# Patient Record
Sex: FEMALE | Race: WHITE | NOT HISPANIC OR LATINO | Employment: FULL TIME | URBAN - NONMETROPOLITAN AREA
[De-identification: names, ages, dates, MRNs, and addresses within clinical notes are randomized per-mention and may not be internally consistent; named-entity substitution may affect disease eponyms.]

---

## 2017-01-27 RX ORDER — CIPROFLOXACIN AND DEXAMETHASONE 3; 1 MG/ML; MG/ML
4 SUSPENSION/ DROPS AURICULAR (OTIC) 2 TIMES DAILY
Qty: 7.5 ML | Refills: 0 | Status: SHIPPED | OUTPATIENT
Start: 2017-01-27 | End: 2017-09-20

## 2017-07-11 RX ORDER — ONDANSETRON 4 MG/1
4 TABLET, FILM COATED ORAL EVERY 6 HOURS PRN
Qty: 30 TABLET | Refills: 1 | Status: SHIPPED | OUTPATIENT
Start: 2017-07-11 | End: 2019-02-19 | Stop reason: SDUPTHER

## 2017-09-14 DIAGNOSIS — Z00.00 WELLNESS EXAMINATION: Primary | ICD-10-CM

## 2017-09-19 ENCOUNTER — LAB (OUTPATIENT)
Dept: LAB | Facility: OTHER | Age: 37
End: 2017-09-19

## 2017-09-19 DIAGNOSIS — Z00.00 WELLNESS EXAMINATION: ICD-10-CM

## 2017-09-19 LAB
25(OH)D3 SERPL-MCNC: 63.7 NG/ML (ref 30–100)
ALBUMIN SERPL-MCNC: 4 G/DL (ref 3.2–5.5)
ALBUMIN/GLOB SERPL: 1 G/DL (ref 1–3)
ALP SERPL-CCNC: 56 U/L (ref 15–121)
ALT SERPL W P-5'-P-CCNC: 21 U/L (ref 10–60)
ANION GAP SERPL CALCULATED.3IONS-SCNC: 11 MMOL/L (ref 5–15)
AST SERPL-CCNC: 24 U/L (ref 10–60)
BASOPHILS # BLD AUTO: 0.06 10*3/MM3 (ref 0–0.2)
BASOPHILS NFR BLD AUTO: 0.9 % (ref 0–2)
BILIRUB SERPL-MCNC: 0.3 MG/DL (ref 0.2–1)
BUN BLD-MCNC: 12 MG/DL (ref 8–25)
BUN/CREAT SERPL: 15 (ref 7–25)
CALCIUM SPEC-SCNC: 9.7 MG/DL (ref 8.4–10.8)
CHLORIDE SERPL-SCNC: 103 MMOL/L (ref 100–112)
CHOLEST SERPL-MCNC: 214 MG/DL (ref 150–200)
CO2 SERPL-SCNC: 26 MMOL/L (ref 20–32)
CREAT BLD-MCNC: 0.8 MG/DL (ref 0.4–1.3)
DEPRECATED RDW RBC AUTO: 41 FL (ref 36.4–46.3)
EOSINOPHIL # BLD AUTO: 0.83 10*3/MM3 (ref 0–0.7)
EOSINOPHIL NFR BLD AUTO: 12.4 % (ref 0–7)
ERYTHROCYTE [DISTWIDTH] IN BLOOD BY AUTOMATED COUNT: 13.6 % (ref 11.5–14.5)
GFR SERPL CREATININE-BSD FRML MDRD: 80 ML/MIN/1.73 (ref 64–149)
GLOBULIN UR ELPH-MCNC: 4.1 GM/DL (ref 2.5–4.6)
GLUCOSE BLD-MCNC: 103 MG/DL (ref 70–100)
HCT VFR BLD AUTO: 43.3 % (ref 35–45)
HDLC SERPL-MCNC: 62 MG/DL (ref 35–100)
HGB BLD-MCNC: 14.2 G/DL (ref 12–15.5)
LDLC SERPL CALC-MCNC: 136 MG/DL
LDLC/HDLC SERPL: 2.19 {RATIO}
LYMPHOCYTES # BLD AUTO: 2.04 10*3/MM3 (ref 0.6–4.2)
LYMPHOCYTES NFR BLD AUTO: 30.4 % (ref 10–50)
MCH RBC QN AUTO: 27.5 PG (ref 26.5–34)
MCHC RBC AUTO-ENTMCNC: 32.8 G/DL (ref 31.4–36)
MCV RBC AUTO: 83.8 FL (ref 80–98)
MONOCYTES # BLD AUTO: 0.61 10*3/MM3 (ref 0–0.9)
MONOCYTES NFR BLD AUTO: 9.1 % (ref 0–12)
NEUTROPHILS # BLD AUTO: 3.17 10*3/MM3 (ref 2–8.6)
NEUTROPHILS NFR BLD AUTO: 47.2 % (ref 37–80)
PLATELET # BLD AUTO: 316 10*3/MM3 (ref 150–450)
PMV BLD AUTO: 9.1 FL (ref 8–12)
POTASSIUM BLD-SCNC: 4.9 MMOL/L (ref 3.4–5.4)
PROT SERPL-MCNC: 8.1 G/DL (ref 6.7–8.2)
RBC # BLD AUTO: 5.17 10*6/MM3 (ref 3.77–5.16)
SODIUM BLD-SCNC: 140 MMOL/L (ref 134–146)
T4 FREE SERPL-MCNC: 1.24 NG/DL (ref 0.78–2.19)
TRIGL SERPL-MCNC: 81 MG/DL (ref 35–160)
TSH SERPL DL<=0.05 MIU/L-ACNC: 1.87 MIU/ML (ref 0.46–4.68)
VIT B12 BLD-MCNC: 533 PG/ML (ref 239–931)
VLDLC SERPL-MCNC: 16.2 MG/DL
WBC NRBC COR # BLD: 6.71 10*3/MM3 (ref 3.2–9.8)

## 2017-09-19 PROCEDURE — 82306 VITAMIN D 25 HYDROXY: CPT | Performed by: FAMILY MEDICINE

## 2017-09-19 PROCEDURE — 80061 LIPID PANEL: CPT | Performed by: FAMILY MEDICINE

## 2017-09-19 PROCEDURE — 84439 ASSAY OF FREE THYROXINE: CPT | Performed by: FAMILY MEDICINE

## 2017-09-19 PROCEDURE — 36415 COLL VENOUS BLD VENIPUNCTURE: CPT | Performed by: FAMILY MEDICINE

## 2017-09-19 PROCEDURE — 82607 VITAMIN B-12: CPT | Performed by: FAMILY MEDICINE

## 2017-09-19 PROCEDURE — 85025 COMPLETE CBC W/AUTO DIFF WBC: CPT | Performed by: FAMILY MEDICINE

## 2017-09-19 PROCEDURE — 84443 ASSAY THYROID STIM HORMONE: CPT | Performed by: FAMILY MEDICINE

## 2017-09-19 PROCEDURE — 80053 COMPREHEN METABOLIC PANEL: CPT | Performed by: FAMILY MEDICINE

## 2017-09-20 ENCOUNTER — OFFICE VISIT (OUTPATIENT)
Dept: FAMILY MEDICINE CLINIC | Facility: CLINIC | Age: 37
End: 2017-09-20

## 2017-09-20 VITALS
DIASTOLIC BLOOD PRESSURE: 68 MMHG | HEIGHT: 64 IN | SYSTOLIC BLOOD PRESSURE: 116 MMHG | WEIGHT: 155 LBS | TEMPERATURE: 97 F | HEART RATE: 72 BPM | BODY MASS INDEX: 26.46 KG/M2

## 2017-09-20 DIAGNOSIS — D48.5 NEOPLASM OF UNCERTAIN BEHAVIOR OF SKIN: ICD-10-CM

## 2017-09-20 DIAGNOSIS — E66.9 OBESITY, UNSPECIFIED OBESITY SEVERITY, UNSPECIFIED OBESITY TYPE: Primary | ICD-10-CM

## 2017-09-20 PROCEDURE — 99395 PREV VISIT EST AGE 18-39: CPT | Performed by: FAMILY MEDICINE

## 2017-09-20 NOTE — PROGRESS NOTES
Subjective   Michelle Powell is a 37 y.o. female who presents to the office for follow-up and review of labs.  She is concerned about several skin lesions and has not had a good skin survey done.  She is still struggling with weight issues and has gained a bit.  She is interested in treatment with something.  She stopped the phentermine as it did not seem to be helping anymore.    History of Present Illness         Review of Systems   Constitutional: Negative.    HENT: Negative.    Respiratory: Negative.  Negative for shortness of breath.    Cardiovascular: Negative.  Negative for chest pain.   Gastrointestinal: Negative.    Musculoskeletal: Negative.  Negative for myalgias.   Skin: Negative.    Allergic/Immunologic: Negative for immunocompromised state.   Neurological: Negative for dizziness, tremors, seizures, syncope, weakness and numbness.   Hematological: Negative.    Psychiatric/Behavioral: Negative for agitation, confusion, dysphoric mood and sleep disturbance. The patient is not nervous/anxious.    All other systems reviewed and are negative.      Objective   Physical Exam   Constitutional: She is oriented to person, place, and time. She appears well-developed and well-nourished.   HENT:   Head: Normocephalic and atraumatic.   Nose: Nose normal.   Mouth/Throat: Oropharynx is clear and moist.   Eyes: Conjunctivae and EOM are normal. Pupils are equal, round, and reactive to light.   Neck: Normal range of motion. Neck supple. No JVD present. No tracheal deviation present. No thyromegaly present.   Cardiovascular: Normal rate, regular rhythm, normal heart sounds and intact distal pulses.    No murmur heard.  Pulmonary/Chest: Effort normal and breath sounds normal. She has no wheezes.   Abdominal: Soft. Bowel sounds are normal. She exhibits no distension. There is no tenderness.   Musculoskeletal: Normal range of motion. She exhibits no edema.   Lymphadenopathy:     She has no cervical adenopathy.    Neurological: She is alert and oriented to person, place, and time. Coordination normal.   Skin: Skin is warm and dry. No rash noted.   Several pigmented moles and seborrheic areas noted over the skin   Psychiatric: She has a normal mood and affect.   Nursing note and vitals reviewed.      Assessment/Plan   Charlie was seen today for wellness.    Diagnoses and all orders for this visit:    Obesity, unspecified obesity severity, unspecified obesity type    Neoplasm of uncertain behavior of skin  -     Ambulatory Referral to Dermatology    Other orders  -     Liraglutide -Weight Management (SAXENDA) 18 MG/3ML solution pen-injector; Inject 3 mg under the skin Daily for 180 days. Start with 0.6mg daily for 1 week, then 1.2mg daily, then 1.8mg daily    Will refer to dermatology for skin survey    We'll try Saxenda for weight loss, along with diet and exercise.       Labs are reviewed with patient.      Lab on 09/19/2017   Component Date Value Ref Range Status   • Glucose 09/19/2017 103* 70 - 100 mg/dL Final   • BUN 09/19/2017 12  8 - 25 mg/dL Final   • Creatinine 09/19/2017 0.80  0.40 - 1.30 mg/dL Final   • Sodium 09/19/2017 140  134 - 146 mmol/L Final   • Potassium 09/19/2017 4.9  3.4 - 5.4 mmol/L Final   • Chloride 09/19/2017 103  100 - 112 mmol/L Final   • CO2 09/19/2017 26.0  20.0 - 32.0 mmol/L Final   • Calcium 09/19/2017 9.7  8.4 - 10.8 mg/dL Final   • Total Protein 09/19/2017 8.1  6.7 - 8.2 g/dL Final   • Albumin 09/19/2017 4.00  3.20 - 5.50 g/dL Final   • ALT (SGPT) 09/19/2017 21  10 - 60 U/L Final   • AST (SGOT) 09/19/2017 24  10 - 60 U/L Final   • Alkaline Phosphatase 09/19/2017 56  15 - 121 U/L Final   • Total Bilirubin 09/19/2017 0.3  0.2 - 1.0 mg/dL Final   • eGFR Non African Amer 09/19/2017 80  64 - 149 mL/min/1.73 Final   • Globulin 09/19/2017 4.1  2.5 - 4.6 gm/dL Final   • A/G Ratio 09/19/2017 1.0  1.0 - 3.0 g/dL Final   • BUN/Creatinine Ratio 09/19/2017 15.0  7.0 - 25.0 Final   • Anion Gap 09/19/2017  11.0  5.0 - 15.0 mmol/L Final   • Total Cholesterol 09/19/2017 214* 150 - 200 mg/dL Final   • Triglycerides 09/19/2017 81  35 - 160 mg/dL Final   • HDL Cholesterol 09/19/2017 62  35 - 100 mg/dL Final   • LDL Cholesterol  09/19/2017 136  mg/dL Final   • VLDL Cholesterol 09/19/2017 16.2  mg/dL Final   • LDL/HDL Ratio 09/19/2017 2.19   Final   • Free T4 09/19/2017 1.24  0.78 - 2.19 ng/dL Final   • TSH 09/19/2017 1.870  0.460 - 4.680 mIU/mL Final   • Vitamin B-12 09/19/2017 533  239 - 931 pg/mL Final   • 25 Hydroxy, Vitamin D 09/19/2017 63.7  30.0 - 100.0 ng/ml Final   • WBC 09/19/2017 6.71  3.20 - 9.80 10*3/mm3 Final   • RBC 09/19/2017 5.17* 3.77 - 5.16 10*6/mm3 Final   • Hemoglobin 09/19/2017 14.2  12.0 - 15.5 g/dL Final   • Hematocrit 09/19/2017 43.3  35.0 - 45.0 % Final   • MCV 09/19/2017 83.8  80.0 - 98.0 fL Final   • MCH 09/19/2017 27.5  26.5 - 34.0 pg Final   • MCHC 09/19/2017 32.8  31.4 - 36.0 g/dL Final   • RDW 09/19/2017 13.6  11.5 - 14.5 % Final   • RDW-SD 09/19/2017 41.0  36.4 - 46.3 fl Final   • MPV 09/19/2017 9.1  8.0 - 12.0 fL Final   • Platelets 09/19/2017 316  150 - 450 10*3/mm3 Final   • Neutrophil % 09/19/2017 47.2  37.0 - 80.0 % Final   • Lymphocyte % 09/19/2017 30.4  10.0 - 50.0 % Final   • Monocyte % 09/19/2017 9.1  0.0 - 12.0 % Final   • Eosinophil % 09/19/2017 12.4* 0.0 - 7.0 % Final   • Basophil % 09/19/2017 0.9  0.0 - 2.0 % Final   • Neutrophils, Absolute 09/19/2017 3.17  2.00 - 8.60 10*3/mm3 Final   • Lymphocytes, Absolute 09/19/2017 2.04  0.60 - 4.20 10*3/mm3 Final   • Monocytes, Absolute 09/19/2017 0.61  0.00 - 0.90 10*3/mm3 Final   • Eosinophils, Absolute 09/19/2017 0.83* 0.00 - 0.70 10*3/mm3 Final   • Basophils, Absolute 09/19/2017 0.06  0.00 - 0.20 10*3/mm3 Final   ]

## 2017-09-22 ENCOUNTER — DOCUMENTATION (OUTPATIENT)
Dept: FAMILY MEDICINE CLINIC | Facility: CLINIC | Age: 37
End: 2017-09-22

## 2017-10-03 ENCOUNTER — OFFICE VISIT (OUTPATIENT)
Dept: FAMILY MEDICINE CLINIC | Facility: CLINIC | Age: 37
End: 2017-10-03

## 2017-10-03 VITALS
HEIGHT: 64 IN | WEIGHT: 155 LBS | DIASTOLIC BLOOD PRESSURE: 92 MMHG | SYSTOLIC BLOOD PRESSURE: 138 MMHG | BODY MASS INDEX: 26.46 KG/M2

## 2017-10-03 DIAGNOSIS — M25.561 RECURRENT PAIN OF RIGHT KNEE: Primary | ICD-10-CM

## 2017-10-03 PROCEDURE — 99214 OFFICE O/P EST MOD 30 MIN: CPT | Performed by: FAMILY MEDICINE

## 2017-10-03 NOTE — PROGRESS NOTES
Subjective   IsisTahir Powell is a 37 y.o. female who presents to the office for knee pain for over 2 years it is getting worse.  She does a lot of walking over the weekend and this seemed to exacerbate it.  Wearing a knee brace seems to help but she's having symptoms of instability along with the pain and grinding especially noticed when she goes up and down steps.  When she's walking down steps or downhill it feels very unstable.  .  Knee Pain    The incident occurred more than 1 week ago. There was no injury mechanism. The pain is present in the right knee. The quality of the pain is described as aching and stabbing. The pain is at a severity of 6/10. The pain is moderate. The pain has been intermittent since onset. Associated symptoms include a loss of motion. Pertinent negatives include no inability to bear weight, loss of sensation, muscle weakness, numbness or tingling. She reports no foreign bodies present. The symptoms are aggravated by movement and weight bearing. She has tried rest and immobilization for the symptoms. The treatment provided mild relief.        The following portions of the patient's history were reviewed and updated as appropriate: allergies, current medications, past family history, past medical history, past social history, past surgical history and problem list.    Review of Systems   Constitutional: Negative.    HENT: Negative.    Respiratory: Negative.  Negative for shortness of breath.    Cardiovascular: Negative.  Negative for chest pain.   Gastrointestinal: Negative.    Musculoskeletal: Negative.  Negative for myalgias.   Skin: Negative.    Allergic/Immunologic: Negative for immunocompromised state.   Neurological: Negative for dizziness, tingling, tremors, seizures, syncope, weakness and numbness.   Hematological: Negative.    Psychiatric/Behavioral: Negative for agitation, confusion, dysphoric mood and sleep disturbance. The patient is not nervous/anxious.    All other  systems reviewed and are negative.      Objective   Physical Exam   Constitutional: She is oriented to person, place, and time. She appears well-developed and well-nourished.   HENT:   Head: Normocephalic and atraumatic.   Nose: Nose normal.   Mouth/Throat: Oropharynx is clear and moist.   Eyes: Conjunctivae and EOM are normal. Pupils are equal, round, and reactive to light.   Neck: Normal range of motion. Neck supple. No JVD present. No tracheal deviation present. No thyromegaly present.   Cardiovascular: Normal rate, regular rhythm, normal heart sounds and intact distal pulses.    No murmur heard.  Pulmonary/Chest: Effort normal and breath sounds normal. She has no wheezes.   Abdominal: Soft. Bowel sounds are normal. She exhibits no distension. There is no tenderness.   Musculoskeletal: Normal range of motion. She exhibits no edema.   Lymphadenopathy:     She has no cervical adenopathy.   Neurological: She is alert and oriented to person, place, and time. Coordination normal.   Skin: Skin is warm and dry. No rash noted.   Several pigmented moles and seborrheic areas noted over the skin   Psychiatric: She has a normal mood and affect.   Nursing note and vitals reviewed.      Assessment/Plan   Charlie was seen today for knee pain.    Diagnoses and all orders for this visit:    Recurrent pain of right knee  -     XR Knee 3 View Right    Will get an x-ray of the knee and gave samples of Duexis with instructions for use.  Suspect we will need an MRI but will await the findings of x-ray.

## 2017-10-04 NOTE — PROGRESS NOTES
Notify patient test results are ok, x-ray is all right, would like an MRI of her knee.  Please see where she would like to do this and send order.

## 2017-10-05 DIAGNOSIS — M25.561 RIGHT KNEE PAIN, UNSPECIFIED CHRONICITY: Primary | ICD-10-CM

## 2017-10-06 ENCOUNTER — CONSULT (OUTPATIENT)
Dept: PHYSICAL THERAPY | Facility: CLINIC | Age: 37
End: 2017-10-06

## 2017-10-06 DIAGNOSIS — M25.561 ACUTE PAIN OF RIGHT KNEE: Primary | ICD-10-CM

## 2017-10-06 PROCEDURE — 97161 PT EVAL LOW COMPLEX 20 MIN: CPT | Performed by: PHYSICAL THERAPIST

## 2017-10-06 NOTE — PROGRESS NOTES
Outpatient Physical Therapy Ortho Initial Evaluation, [place]       Patient Name: Charlie Powell  : 1980  MRN: [id number]  Today's Date: 10/6/2017      Visit Date: 10/06/2017      Attendance    Authorized Not approved   Pre Rx pain 5   Post Rx pain 5   % improvement N/A   MD follow up none   Recert date 10/27/17               Patient Active Problem List   Diagnosis   • Vomiting   • Vitamin D deficiency   • Urinary tract infectious disease   • Seasonal allergic rhinitis   • Screening for malignant neoplasm of breast   • Orbital cellulitis   • Onychomycosis of toenail   • Menometrorrhagia   • Obesity   • Megaloblastic anemia due to vitamin B12 deficiency   • Malaise and fatigue   • Irritable bowel syndrome   • Hypertensive disorder   • Gastroenteritis presumed infectious   • Fatigue   • Exercise-induced asthma   • Essential hypertension   • Epistaxis   • Dysuria   • Deviated nasal septum   • Dehydration   • Chronic sinusitis   • Cervical lymphadenitis   • Breast lump   • Blurring of visual image   • Allergic rhinitis   • Acute upper respiratory infection   • Acute sinusitis   • Acute allergic reaction   • Abducens nerve palsy        Past Medical History:   Diagnosis Date   • Abducens nerve palsy     Duanes syndrome OS, type I, congenital      • Acute allergic reaction     likely urticaria with L eyelid edema      • Acute sinusitis    • Acute upper respiratory infection    • Allergic rhinitis    • Blurring of visual image     OS, 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, now with myopia      • Breast lump     Multiple scars from previous reduction surgeries      • Cervical lymphadenitis    • Chronic sinusitis    • Dehydration    • Deviated nasal septum    • Dysuria    • Epistaxis    • Essential hypertension    • Exercise-induced asthma    • Fatigue    • Gastroenteritis presumed infectious    • Hypertensive disorder    • Irritable bowel syndrome    • Malaise and fatigue    • Megaloblastic anemia due to  vitamin B12 deficiency    • Menometrorrhagia    • Obesity    • Onychomycosis of toenail    • Orbital cellulitis    • Screening for malignant neoplasm of breast    • Seasonal allergic rhinitis    • Urinary tract infectious disease    • Vitamin D deficiency    • Vomiting         Past Surgical History:   Procedure Laterality Date   • BILATERAL BREAST REDUCTION     • CHOLECYSTECTOMY     • INJECTION OF MEDICATION  2016   • INJECTION OF MEDICATION  2013    Kenalog (1)      • INJECTION OF MEDICATION  2012    Zofran (1)      • SINUS SURGERY  2013    septum plasty. bone spur removal   • TONSILLECTOMY     • TYMPANOSTOMY Bilateral        Visit Dx:     ICD-10-CM ICD-9-CM   1. Acute pain of right knee M25.561 719.46       Subjective Evaluation    History of Present Illness  Onset date: 2-3 months.  Mechanism of injury: Cross fit    Subjective comment: On and off issues for 2 years.  Started with doing cross fit doing burpees. Improved conservatively then 2-3 months then started getting stiff again. Difficulty with going down hill.  Neoprene sleeve helps to get thru work day.  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 [employer] Quality of life: good    Pain  Current pain ratin  At best pain rating: 3  At worst pain ratin  Location: right knee  Quality: pressure, tight and dull ache  Relieving factors: rest, medications and ice  Aggravating factors: squatting, standing, prolonged positioning and ambulation  Progression: worsening    Social Support  Lives in: multiple-level home  Lives with: spouse    Hand dominance: right    Diagnostic Tests  X-ray: normal    Treatments  Previous treatment: medication  Current treatment: medication  Patient Goals  Patient goals for therapy: decreased pain, increased motion, increased strength, independence with ADLs/IADLs and return to sport/leisure activities        OBJECTIVE:  Patient presents today with antalgic gait,  she has neoprene wrap around sleeve.  There is no obvious  swelling.  Circumferential measurement at the joint line right 38.0 left 37.7.  Range of motion right 0-107, left 0-140.  There is good patellar mobility.  She has guarding which limits ability for drawer and Lachman testing.  There is tenderness to palpation along the medial joint line more anteriorly than posteriorly.  She lacks flexion enough to perform Jonah's.  Negative valgus varus testing.    Treatment is deferred for insurance authorization she is urged to continue edema control with icing and wear of the brace when up at work.  Start isometric QS and in flexion straight leg raises.              Assessment & Plan     Assessment  Impairments: abnormal or restricted ROM, lacks appropriate home exercise program and pain with function  Assessment details: Right knee pain and swelling.  Old injury with new occurrence.  She is guarded with ligamentous laxity testing most likely meniscal or ACL involvement.  Prognosis: good  Functional Limitations: walking, uncomfortable because of pain, sitting and standing  Goals  Plan Goals: Short-term goals:  1.  Patient will have 120° right knee flexion.  2.  Patient will have 0° extension without pain.  3.  Patient will have 5 out of 5 manual muscle test quads and hamstrings without increased discomfort.  4.  Patient have LEFS score 40/80.  5.  Patient will be independent in home exercise program.    Plan  Therapy options: will be seen for skilled physical therapy services  Planned modality interventions: cryotherapy and electrical stimulation/Russian stimulation  Planned therapy interventions: strengthening, stretching, gait training, functional ROM exercises and home exercise program  Frequency: 2x week  Duration in weeks: 4  Treatment plan discussed with: patient  Plan details: Knee range of motion isometrics progression to close kinetic chain activities as tolerated ice and electrical stimulation        Time Calculation: [phone]            Tarah Carter, PT,  HUSEYIN MILLER  10/6/2017        Charlie Powell    1980

## 2017-10-12 ENCOUNTER — TREATMENT (OUTPATIENT)
Dept: PHYSICAL THERAPY | Facility: CLINIC | Age: 37
End: 2017-10-12

## 2017-10-12 DIAGNOSIS — M25.561 ACUTE PAIN OF RIGHT KNEE: Primary | ICD-10-CM

## 2017-10-12 PROCEDURE — 97110 THERAPEUTIC EXERCISES: CPT | Performed by: PHYSICAL THERAPIST

## 2017-10-12 PROCEDURE — 97014 ELECTRIC STIMULATION THERAPY: CPT | Performed by: PHYSICAL THERAPIST

## 2017-10-12 NOTE — PROGRESS NOTES
"Daily Treatment Note    Time In: 1446      Time Out: 1542    ICD-10-CM ICD-9-CM   1. Acute pain of right knee M25.561 719.46       Subjective   Pt c/o knee pain around knee cap. She is trying to do HEP.   Patient reports to therapy 4/10 pain, and  0% improvement.  Attendance  2/2 visits. Recert 10/27. MD f/u MAYTE.      Objective    Amb with R AG. V cues necessary for correct TE performance. Pt mildly apprehensive with exercises. Post treatment pain 3/10.       PROCEDURES AND MODALITIES:       Ice  Ice Applied: Yes  Location: R Knee  Rx Minutes: 15 mins  Ice S/P Rx: Yes    Electrical Stimulation  Stimulation Type: IFC  Location/Electrode Placement/Other: R Knee  Rx Minutes: 15 mins       EXERCISE  Exercise 1  Exercise Name 1: Bike   Time: 2'  Exercise 2  Exercise Name 2: Incline bd stretch  Time 2: 1' Exercise 3  Exercise Name 3: HS stretch  Time 3: 1' Exercise 4  Exercise Name 4: SAQ  Sets/Reps 4: 20x Exercise 5  Exercise Name 5: SLR  Sets/Reps 5: 2/10x Exercise 6  Exercise Name 6: Clamshells  Sets/Reps 6: 30x   Exercise 7  Exercise Name 7: Prone quad stretch  Sets/Reps 7: 3/30\" Exercise 8  Exercise Name 8: Prone HS curls  Sets/Reps 8: 20x                                       Therapy Exercise [id number] 41 minutes and Other Procedure CPT 15 minutes Electrical Stimulation    Total Treatment Time: 56 Minutes    Assessment/Plan   Good tolerance of today's treatment. ROM/strength need increasing. Pt continues to benefit from PT for further progression towards goals.  Progress per Plan of Care             Han Walker, PTA  Physical Therapist    "

## 2017-10-17 ENCOUNTER — TREATMENT (OUTPATIENT)
Dept: PHYSICAL THERAPY | Facility: CLINIC | Age: 37
End: 2017-10-17

## 2017-10-17 DIAGNOSIS — M25.561 ACUTE PAIN OF RIGHT KNEE: Primary | ICD-10-CM

## 2017-10-17 DIAGNOSIS — S83.001A SUBLUXATION OF RIGHT PATELLA, INITIAL ENCOUNTER: Primary | ICD-10-CM

## 2017-10-17 PROCEDURE — 97014 ELECTRIC STIMULATION THERAPY: CPT | Performed by: PHYSICAL THERAPIST

## 2017-10-17 PROCEDURE — 97110 THERAPEUTIC EXERCISES: CPT | Performed by: PHYSICAL THERAPIST

## 2017-10-17 NOTE — PROGRESS NOTES
"Daily Treatment Note    Time In: 1400      Time Out: 1455    ICD-10-CM ICD-9-CM   1. Acute pain of right knee M25.561 719.46       Subjective   Pt reports cont knee pain and intermittent clicking at patella. She is doing HEP. Possible f/u with ortho.   Patient reports to therapy 2/10 pain, and % improvement.  Attendance  3/3 visits. Recert 10/27. MD f/u ?.      Objective    Amb Ind, with R AG. V cues necessary for correct TE performance. Post treatment pain 1/10.     AROM:    0°-120°.            PROCEDURES AND MODALITIES:     Ice  Ice Applied: Yes  Location: R Knee  Rx Minutes: 15 mins  Ice S/P Rx: Yes    Electrical Stimulation  Stimulation Type: IFC  Max mAmp: 11  Location/Electrode Placement/Other: R Knee  Rx Minutes: 15 mins    EXERCISE  Exercise 1  Exercise Name 1: Bike   Time: 5'  Exercise 2  Exercise Name 2: Incline bd stretch  Time 2: 1' Exercise 3  Exercise Name 3: HS stretch  Time 3: 1' Exercise 4  Exercise Name 4: SLR  Sets/Reps 4: 2 sets Exercise 5  Exercise Name 5: SAQ  Sets/Reps 5: 20x Exercise 6  Exercise Name 6: Clamshells  Sets/Reps 6: 30x   Exercise 7  Exercise Name 7: Prone quad stretch  Sets/Reps 7: 2/30\" Exercise 8  Exercise Name 8: Prone HS curls  Sets/Reps 8: 20x Exercise 9  Exercise Name 9: LAQ  Equipment/Resistance 9: 2#  Sets/Reps 9: 2/10x                                       Therapy Exercise [id number] 40 minutes and Other Procedure CPT 15 minutes Electrical Stimulation    Total Treatment Time: 55 Minutes    Assessment/Plan   STG 1,2 met. Knee flex ROM improved. Good tolerance of today's treatment.   Progress per Plan of Care             Han Walker, PTA  Physical Therapist    "

## 2017-10-18 ENCOUNTER — TREATMENT (OUTPATIENT)
Dept: PHYSICAL THERAPY | Facility: CLINIC | Age: 37
End: 2017-10-18

## 2017-10-18 DIAGNOSIS — M25.561 ACUTE PAIN OF RIGHT KNEE: Primary | ICD-10-CM

## 2017-10-18 PROCEDURE — 97110 THERAPEUTIC EXERCISES: CPT | Performed by: PHYSICAL THERAPIST

## 2017-10-18 PROCEDURE — 97014 ELECTRIC STIMULATION THERAPY: CPT | Performed by: PHYSICAL THERAPIST

## 2017-10-18 NOTE — PROGRESS NOTES
"Daily Treatment Note    Time In: 10:15      Time Out: 11:10    ICD-10-CM ICD-9-CM   1. Acute pain of right knee M25.561 719.46       Subjective   Pt reports no knee pain, just \"pressure\".   Patient reports to therapy 0/10 pain, and % improvement.  Attendance  4/4 visits. Recert 10/27. MD f/u ?.      Objective    Amb with R AG. V cues necessary for correct TE performance. Intermittent c/o burning at knee with TE.     PROCEDURES AND MODALITIES:     Ice  Ice Applied: Yes  Location: R Knee  Rx Minutes: 15 mins  Ice S/P Rx: Yes    Electrical Stimulation  Stimulation Type: IFC  Max mAmp: 11  Location/Electrode Placement/Other: R Knee  Rx Minutes: 15 mins       EXERCISE  Exercise 1  Exercise Name 1: Bike   Time: 8'  Exercise 2  Exercise Name 2: Prone knee flex stretch  Sets/Reps 2: 3/30\" Exercise 3  Exercise Name 3: Prone HS curls  Sets/Reps 3: 20x Exercise 4  Exercise Name 4: Seated HS curls  Equipment/Resistance 4: red tb  Sets/Reps 4: 15x Exercise 5  Exercise Name 5: SLR  Sets/Reps 5: 2/15x Exercise 6  Exercise Name 6: Incline bd stretch  Time 6: 1'   Exercise 7  Exercise Name 7: ST HS stretch  Time 7: 1' Exercise 8  Exercise Name 8: CC  TKE  Equipment/Resistance 8: 15#  Sets/Reps 8: 2/10x                                         Therapy Exercise 23786 40 minutes and Other Procedure CPT 15 minutes Electrical Stimulation    Total Treatment Time: 55 Minutes    Assessment/Plan   Fair tolerance of today's treatment. Gradual motion increase with exercising.   Progress per Plan of Care             Han Walker PTA  Physical Therapist    "

## 2017-10-24 ENCOUNTER — TREATMENT (OUTPATIENT)
Dept: PHYSICAL THERAPY | Facility: CLINIC | Age: 37
End: 2017-10-24

## 2017-10-24 DIAGNOSIS — M25.561 ACUTE PAIN OF RIGHT KNEE: Primary | ICD-10-CM

## 2017-10-24 PROCEDURE — 97014 ELECTRIC STIMULATION THERAPY: CPT | Performed by: PHYSICAL THERAPIST

## 2017-10-24 PROCEDURE — 97110 THERAPEUTIC EXERCISES: CPT | Performed by: PHYSICAL THERAPIST

## 2017-10-24 RX ORDER — IBUPROFEN 800 MG/1
800 TABLET ORAL 3 TIMES DAILY PRN
Qty: 90 TABLET | Refills: 5 | Status: SHIPPED | OUTPATIENT
Start: 2017-10-24 | End: 2019-07-10 | Stop reason: SDUPTHER

## 2017-10-24 NOTE — PROGRESS NOTES
"Daily Treatment Note    Time In: 1445      Time Out: 1545    ICD-10-CM ICD-9-CM   1. Acute pain of right knee M25.561 719.46       Subjective   Pt had ortho consult 10/19. She received injection in knee and advised 3 more weeks of PT.   Patient reports to therapy 4/10 pain, and some % improvement.  Attendance  5/5 visits. [place] 10/27. MD f/u 11/27.      Objective    Amb with R AG. V cues necessary for correct TE performance. Intermittent c/o discomfort with TE.       PROCEDURES AND MODALITIES:        Ice  Ice Applied: Yes  Location: R Knee  Rx Minutes: 15 mins  Ice S/P Rx: Yes    Electrical Stimulation  Stimulation Type: IFC  Max mAmp: 11  Location/Electrode Placement/Other: R Knee  Rx Minutes: 15 mins       EXERCISE  Exercise 1  Exercise Name 1: Bike   Time: 8'  Exercise 2  Exercise Name 2: HS stretch  Time 2: 1' Exercise 3  Exercise Name 3: Incline bd stretch  Time 3: 1' Exercise 4  Exercise Name 4: Prone knee flex stretch  Sets/Reps 4: 2/30\" Exercise 5  Exercise Name 5: Prone HS curls  Equipment/Resistance 5: 1#  Sets/Reps 5: 20x Exercise 6  Exercise Name 6: SLR  Equipment/Resistance 6: 1#  Sets/Reps 6: 2/20x   Exercise 7  Exercise Name 7: Bridges with abd  Equipment/Resistance 7: red tb  Sets/Reps 7: 20x Exercise 8  Exercise Name 8: CC  TKE  Equipment/Resistance 8: 20#  Sets/Reps 8: 20x                                       Therapy Exercise [id number] 45 minutes and Other Procedure CPT 15 minutes Electrical Stimulation    Total Treatment Time: 60 Minutes    Assessment/Plan   Mildly decreased reactivity 2° injection. Good tolerance of today's treatment. Pt continues to benefit from PT for further progression towards goals.  Progress per Plan of Care             Han Walker, PTA  Physical Therapist    "

## 2017-10-25 ENCOUNTER — TREATMENT (OUTPATIENT)
Dept: PHYSICAL THERAPY | Facility: CLINIC | Age: 37
End: 2017-10-25

## 2017-10-25 DIAGNOSIS — M25.561 ACUTE PAIN OF RIGHT KNEE: Primary | ICD-10-CM

## 2017-10-25 PROCEDURE — 97110 THERAPEUTIC EXERCISES: CPT | Performed by: PHYSICAL THERAPIST

## 2017-10-25 PROCEDURE — 97014 ELECTRIC STIMULATION THERAPY: CPT | Performed by: PHYSICAL THERAPIST

## 2017-10-25 NOTE — PROGRESS NOTES
"Recertification    Diagnosis/ICD-10 Code:  Acute pain of right knee [M25.561]  Referring practitioner: Reyna Juan MD  Date of Initial Visit: 10/25/2017  Patient seen for 6/6 sessions    Time in: 1234 Time out: 1335 Total time: 61 min  Subjective:   Charlie Powell states: pt reports 4/10 right knee pain, 50% improvement overall, c/o popping and clicking at times, painful right knee pushing hard against brake while driving.       Objective:   Current condition: Fair  Test measurement: MMT: right knee-flex 3+/5 ext 4-/5 right hip add 3-/5 right hip add 3-/5 AROM: R knee: flex 123° ext 0° LEFS score 38/80     Assessment:   Summary of Treatment:     Ice  Ice Applied: Yes  Location: R Knee  Rx Minutes: 15 mins  Ice S/P Rx: Yes    Electrical Stimulation  Stimulation Type: IFC  Location/Electrode Placement/Other: R Knee  Rx Minutes: 15 mins            EXERCISE  Exercise 1  Exercise Name 1: Bike   Time: 8'  Exercise 2  Exercise Name 2: HS stretch  Time 2: 1' Exercise 3  Exercise Name 3: Incline bd stretch  Sets/Reps 3: 2/30\" Exercise 4  Exercise Name 4: R SLR  Equipment/Resistance 4: 3#  Sets/Reps 4: 2/10 Exercise 5  Exercise Name 5: Prone HS curls  Equipment/Resistance 5: 2#  Sets/Reps 5: 1/15 Exercise 6  Exercise Name 6: S/L hip adduction  Sets/Reps 6: 1/10   Exercise 7  Exercise Name 7: S/L hip abduction  Sets/Reps 7: 1/10 Exercise 8  Exercise Name 8: seated R patellar glides: superior  Sets/Reps 8: 3' Exercise 9  Exercise Name 9: ROM/MMT reassessment  Time 9: 3'             Therex 46 min    Progress toward previous goals: Continue STG/LTG  Met ROM goals. Limited by severe R knee weakness and pain.      Plan:   Goals  Short-term goals:  1.  Patient will have 120° right knee flexion.-MET  2.  Patient will have 0° extension without pain.-MET  3.  Patient will have 5 out of 5 manual muscle test quads and hamstrings without increased discomfort.-PROGRESSING  4.  Patient have LEFS score 40/80.-PROGRESSING  5.  " Patient will be independent in home exercise program.-PROGRESSING   Long term goals:  1. Decrease right knee pain to 1-2/10 pain with squatting and climbing up stairs.  2. Improve right hip add MMT to 4-/5 level.  3. Improve right hip abd MMT to 4/5.    Plan  Timeframe: 1 month  Prognosis to achieve goals: fair    Plan  Treatment plan with rationale: The patient is to  be seen 2 time(s) per week, for 4 week(s) to progress toward short and long term goals.  Joint mobilizations to decrease pain and/or increase ROM   Soft tissue mobilization as therapeutically necessary to decrease pain and/or increase mobility  Therapeutic exercises to increase functional mobility  Perform modalities as therapeutically necessary to decrease pain and increase mobility  Recommendations: Initiate/continue PT services    PT Signature: Darrick Crockett, PT      Based upon review of the patient's progress and continued therapy plan, it is my medical opinion that Charlie Powell should continue physical therapy treatment at [facility] PHYS[facility] TH[facility]  [address] [place] [address] [place] [address]  [phone].    Signature:  Reyna Juan MD

## 2017-10-31 ENCOUNTER — TREATMENT (OUTPATIENT)
Dept: PHYSICAL THERAPY | Facility: CLINIC | Age: 37
End: 2017-10-31

## 2017-10-31 DIAGNOSIS — M25.561 ACUTE PAIN OF RIGHT KNEE: Primary | ICD-10-CM

## 2017-10-31 PROCEDURE — 97110 THERAPEUTIC EXERCISES: CPT | Performed by: PHYSICAL THERAPIST

## 2017-10-31 PROCEDURE — 97014 ELECTRIC STIMULATION THERAPY: CPT | Performed by: PHYSICAL THERAPIST

## 2017-10-31 NOTE — PROGRESS NOTES
"Daily Treatment Note    Time In: 12:32      Time Out: 1337    ICD-10-CM ICD-9-CM   1. Acute pain of right knee M25.561 719.46       Subjective   Pt reports gradual improvement with strength and pain.   Patient reports to therapy 0/10 pain, and 60% improvement.  Attendance  7/7 visits. 3 more approved. Recert 11/15. MD f/mckinley 11/27.      Objective    Amb Ind with R AG. V cues necessary for correct TE performance.       PROCEDURES AND MODALITIES:       Ice  Ice Applied: Yes  Location: R Knee  Rx Minutes: 15 mins  Ice S/P Rx: Yes    Electrical Stimulation  Stimulation Type: IFC  Location/Electrode Placement/Other: R Knee  Rx Minutes: 15 mins       EXERCISE  Exercise 1  Exercise Name 1: Bike   Time: 8'  Exercise 2  Exercise Name 2: HS stretch  Time 2: 1' Exercise 3  Exercise Name 3: Incline bd stretch  Time 3: 1' Exercise 4  Exercise Name 4: Prone Knee flex stretch  Sets/Reps 4: 3/30\" Exercise 5  Exercise Name 5: Prone HS curls  Equipment/Resistance 5: 1#  Sets/Reps 5: 20x Exercise 6  Exercise Name 6: SLR  Equipment/Resistance 6: 1#  Sets/Reps 6: 2/15x   Exercise 7  Exercise Name 7: Bridges with add  Sets/Reps 7: 20x Exercise 8  Exercise Name 8: TG R squat  Equipment/Resistance 8: L4  Sets/Reps 8: 2/15x Exercise 9  Exercise Name 9: R ant lunges  Sets/Reps 9: 15x, 10x                                       Therapy Exercise [id number] 50 minutes and Other Procedure CPT 15 minutes Electrical Stimulation    Total Treatment Time: 65 Minutes    Assessment/Plan   Good tolerance of today's treatment with increased TE intensity. Pt continues to benefit from PT for further progression towards goals.  Progress per Plan of Care             Han Walker, PTA  Physical Therapist    "

## 2017-11-02 ENCOUNTER — TREATMENT (OUTPATIENT)
Dept: PHYSICAL THERAPY | Facility: CLINIC | Age: 37
End: 2017-11-02

## 2017-11-02 DIAGNOSIS — M25.561 ACUTE PAIN OF RIGHT KNEE: Primary | ICD-10-CM

## 2017-11-02 PROCEDURE — 97110 THERAPEUTIC EXERCISES: CPT | Performed by: PHYSICAL THERAPIST

## 2017-11-02 PROCEDURE — 97014 ELECTRIC STIMULATION THERAPY: CPT | Performed by: PHYSICAL THERAPIST

## 2017-11-02 NOTE — PROGRESS NOTES
"Daily Treatment Note    Time In: 1310      Time Out: 1409    ICD-10-CM ICD-9-CM   1. Acute pain of right knee M25.561 719.46       Subjective   Pt reports mild knee pain today. She has not been that busy today.   Patient reports to therapy 1/10 pain, and 60% improvement.  Attendance  8/8 visits. Recert 11/15. MD f/mckinley 11/27.      Objective    Amb with R AG. V cues necessary for correct TE performance. Mild apprehension with TE. Post treatment knee pain 1/10.      PROCEDURES AND MODALITIES:     Ice  Ice Applied: Yes  Location: R Knee  Rx Minutes: 15 mins  Ice S/P Rx: Yes    Electrical Stimulation  Stimulation Type: IFC  Max mAmp: 10  Location/Electrode Placement/Other: R Knee  Rx Minutes: 15 mins       EXERCISE  Exercise 1  Exercise Name 1: Bike   Time: 10'  Exercise 2  Exercise Name 2: HS stretch  Time 2: 1' Exercise 3  Exercise Name 3: Incline bd stretch  Time 3: 1' Exercise 4  Exercise Name 4: Prone Knee flex stretch  Sets/Reps 4: 3/30\" Exercise 5  Exercise Name 5: Bridges with add  Sets/Reps 5: 25x Exercise 6  Exercise Name 6: Seated HS curls  Equipment/Resistance 6: red tb  Sets/Reps 6: 22x   Exercise 7  Exercise Name 7: TG R squats  Equipment/Resistance 7: L$  Sets/Reps 7: 2/15x Exercise 8  Exercise Name 8: Sidestep  Equipment/Resistance 8: yellow tb  Sets/Reps 8: 1 trip Exercise 9  Exercise Name 9: Step-ups  Equipment/Resistance 9: 6in  Sets/Reps 9: 20x                                       Therapy Exercise [id number] 44 minutes and Other Procedure CPT 15 minutes Electrical Stimulation    Total Treatment Time: 59 Minutes    Assessment/Plan   Pt gradually progressing towards goals. Gait pattern still altered and needs improving.   Progress per Plan of Care             Han Walker, PTA  Physical Therapist    "

## 2017-11-09 ENCOUNTER — TREATMENT (OUTPATIENT)
Dept: PHYSICAL THERAPY | Facility: CLINIC | Age: 37
End: 2017-11-09

## 2017-11-09 DIAGNOSIS — M25.561 ACUTE PAIN OF RIGHT KNEE: Primary | ICD-10-CM

## 2017-11-09 PROCEDURE — 97014 ELECTRIC STIMULATION THERAPY: CPT | Performed by: PHYSICAL THERAPIST

## 2017-11-09 PROCEDURE — 97110 THERAPEUTIC EXERCISES: CPT | Performed by: PHYSICAL THERAPIST

## 2017-11-09 NOTE — PROGRESS NOTES
"Daily Treatment Note    Time In: 1236     Time Out: 1330    ICD-10-CM ICD-9-CM   1. Acute pain of right knee M25.561 719.46       Subjective     Patient reports to therapy 3/10 pain, and  70% improvement.  Attendance  9/9 visits. Recert 11/15. MD galeano/mckinley 11/27.      Objective        AROM: deferred testing  Posture: severe pes planus R foot, moderate genu valgum, R>L LE                  PROCEDURES AND MODALITIES:            Ice  Ice Applied: Yes  Location: R Knee  Rx Minutes: 15 mins  Ice S/P Rx: Yes    Electrical Stimulation  Stimulation Type: IFC  Location/Electrode Placement/Other: R Knee  Rx Minutes: 15 mins                   EXERCISE  Exercise 1  Exercise Name 1: Bike   Time: 10'  Exercise 2  Exercise Name 2: HS stretch  Sets/Reps 2: 1/3  Time 2: 30\" hold Exercise 3  Exercise Name 3: Incline bd stretch  Sets/Reps 3: 1/3  Time 3: 30\" kirt Exercise 4  Exercise Name 4: unilateral heel raises  Sets/Reps 4: 2/10 Exercise 5  Exercise Name 5: SLS firm vs Airex (compliant) surface  Sets/Reps 5: 2'/2' (total) Exercise 6  Exercise Name 6: resisted TKE w/ green TB  Sets/Reps 6: 1/20   Exercise 7  Exercise Name 7: step downs 4\"  Sets/Reps 7: 1/20 Exercise 8  Exercise Name 8: mini squats  Sets/Reps 8: 1/15 Exercise 9  Exercise Name 9: Postural education  Time 9: 3'                                       Therapy Exercise [id number] 39 minutes and Other Procedure CPT 15 minutes unattended estim    Total Treatment Time: 54 Minutes    Assessment/Plan   Pt limited by R knee pain, patellar instability influenced partly by increased genu valgus stress on R knee coupled with severe pes planus with R foot  Recommended pt purchase an OTC pronation corrective inserts.  Address eccentric control of R quads, knee stabilization ex's, check tolerance to use of orthotic inserts. Pt educated on possibility of a better knee brace for patellar tracking/stabilization concerns before considering surgical intervention (tibial tubercle osteotomy).         "     Darrick Crockett, PT  Physical Therapist

## 2017-11-16 ENCOUNTER — TREATMENT (OUTPATIENT)
Dept: PHYSICAL THERAPY | Facility: CLINIC | Age: 37
End: 2017-11-16

## 2017-11-16 DIAGNOSIS — M25.561 ACUTE PAIN OF RIGHT KNEE: Primary | ICD-10-CM

## 2017-11-16 PROCEDURE — 97014 ELECTRIC STIMULATION THERAPY: CPT | Performed by: PHYSICAL THERAPIST

## 2017-11-16 PROCEDURE — 97110 THERAPEUTIC EXERCISES: CPT | Performed by: PHYSICAL THERAPIST

## 2017-11-16 NOTE — PROGRESS NOTES
"Recertification    Diagnosis/ICD-10 Code:  Acute pain of right knee [M25.561]  Referring practitioner: Reyna Juan MD  Date of Initial Visit: 11/16/2017  Patient seen for 10/10 sessions    Time in: 1232 Time out: 1335 Total time: 63 min  Subjective:   Charlie Powell states: pt reports saw MD who ordered additional 6-8 weeks of skilled PT, issued a knee brace from Dr. Manzo yesterday. Feels mostly weakness as the day progresses, worsens with prolonged sitting or standing. Cancelled tibial osteotomy procedure at this time. Rates right knee pain at 4/10 at this time, sore and pressure type pain. Reports bruising on R thigh from use of knee brace which is tight on the patient.       Objective:   Current condition: Fair  Test measurement: MMT: right knee-ext 4-/5 (quads) flex 4/5 hip ext 4/5 hip abd 4/5 hip add 3+/5 AROM: right knee  -3-117°, deep squat to 47° R knee flex before onset of R knee pain/symptoms, LEFS 41  Moderately antalgic gait pattern with R LE (excessive R knee ext throughout gait cycle) wearing old knee brace without an AD.  Assessment:   Summary of Treatment:     Ice  Ice Applied: Yes  Location: R Knee  Rx Minutes: 15 mins  Ice S/P Rx: Yes    Electrical Stimulation  Stimulation Type: IFC  Location/Electrode Placement/Other: R Knee  Rx Minutes: 15 mins              EXERCISE-48 min  Exercise 1  Exercise Name 1: Bike   Time: 10'  Exercise 2  Exercise Name 2: prone right hip extension  Sets/Reps 2: 1/10 Exercise 3  Exercise Name 3: prone knee flex  Sets/Reps 3: 1/10 Exercise 4  Exercise Name 4: supine SLR x 4  Sets/Reps 4: 1/10 ea Exercise 5  Exercise Name 5: bridging w/ iso B hip add  Sets/Reps 5: 1/15 Exercise 6  Exercise Name 6: mini squats  Sets/Reps 6: 1/15   Exercise 7  Exercise Name 7: unilateral R heel raises  Sets/Reps 7: 1/20 Exercise 8  Exercise Name 8: SLS on R  Sets/Reps 8: 13\",23\" average-18 sec Exercise 9  Exercise Name 9: MMT/ROM reassessment  Time 9: 3'           Progress toward " previous goals: Continue STG/LTG        Plan:   Goals  Short-term goals:  1.  Patient will have 120° right knee flexion.-MET  2.  Patient will have 0° extension without pain.-MET  3.  Patient will have 5 out of 5 manual muscle test quads and hamstrings without increased discomfort.-PROGRESSING  4.  Patient have LEFS score 40/80.-MET  5.  Patient will be independent in home exercise program.-PROGRESSING   Long term goals:  1. Decrease right knee pain to 1-2/10 pain with squatting and climbing up stairs.  2. Improve right hip add MMT to 4-/5 level.  3. Improve right hip abd MMT to 4+/5.-MET,upgraded    Timeframe: 1 month  Prognosis to achieve goals: fair    Plan  Treatment plan with rationale: The patient is to  be seen 2 time(s) per week, for 4 week(s) to progress toward short and long term goals.  Joint mobilizations to decrease pain and/or increase ROM   Soft tissue mobilization as therapeutically necessary to decrease pain and/or increase mobility  Therapeutic exercises to increase functional mobility  Perform modalities as therapeutically necessary to decrease pain and increase mobility  Recommendations: Initiate/continue PT services    PT Signature: Darrick Crockett, PT      Based upon review of the patient's progress and continued therapy plan, it is my medical opinion that Charlie Powell should continue physical therapy treatment at [facility] PH[facility] TH[facility]AP[facility]  [address] [place] [address] Dr Keny[place] [id number] [phone].    Signature:  Reyna Juan MD

## 2017-11-21 ENCOUNTER — TRANSCRIBE ORDERS (OUTPATIENT)
Dept: PHYSICAL THERAPY | Facility: CLINIC | Age: 37
End: 2017-11-21

## 2017-11-21 DIAGNOSIS — M25.561 ACUTE PAIN OF RIGHT KNEE: Primary | ICD-10-CM

## 2017-11-27 RX ORDER — FEXOFENADINE HYDROCHLORIDE AND PSEUDOEPHEDRINE HYDROCHLORIDE 180; 240 MG/1; MG/1
TABLET, FILM COATED, EXTENDED RELEASE ORAL
Qty: 30 TABLET | Refills: 5 | Status: SHIPPED | OUTPATIENT
Start: 2017-11-27 | End: 2018-02-28 | Stop reason: SDUPTHER

## 2017-11-28 ENCOUNTER — TREATMENT (OUTPATIENT)
Dept: PHYSICAL THERAPY | Facility: CLINIC | Age: 37
End: 2017-11-28

## 2017-11-28 DIAGNOSIS — M25.561 ACUTE PAIN OF RIGHT KNEE: Primary | ICD-10-CM

## 2017-11-28 PROCEDURE — 97014 ELECTRIC STIMULATION THERAPY: CPT | Performed by: PHYSICAL THERAPIST

## 2017-11-28 PROCEDURE — 97110 THERAPEUTIC EXERCISES: CPT | Performed by: PHYSICAL THERAPIST

## 2017-11-28 NOTE — PROGRESS NOTES
"Daily Treatment Note    Time In: 12:30      Time Out: 1323    ICD-10-CM ICD-9-CM   1. Acute pain of right knee M25.561 719.46       Subjective   Pt reports some mild knee pain. Continued slow progress.   Patient reports to therapy 3/10 pain, and 70% improvement.  Attendance  11/11 visits. Recert 12/7. MD f/mckinley HU.      Objective    Amb with R AG. Brace wear. V cues necessary for correct TE performance. Post treatment pain 3/10.     PROCEDURES AND MODALITIES:   Ice  Ice Applied: Yes  Location: R Knee  Rx Minutes: 15 mins  Ice S/P Rx: Yes    Electrical Stimulation  Stimulation Type: IFC  Location/Electrode Placement/Other: R Knee  Rx Minutes: 15 mins       EXERCISE  Exercise 1  Exercise Name 1: Bike   Time: 8.5'  Exercise 2  Exercise Name 2: Prone knee flex stretch  Sets/Reps 2: 2/30\" Exercise 3  Exercise Name 3: Seated HS curls  Equipment/Resistance 3: blue tb  Sets/Reps 3: 2/10x Exercise 4  Exercise Name 4: TG squats  Equipment/Resistance 4: L6  Sets/Reps 4: 20x Exercise 5  Exercise Name 5: SLS, balance  Sets/Reps 5: 3 sets Exercise 6  Exercise Name 6: Reverse Lunge  Sets/Reps 6: 2/10x   Exercise 7  Exercise Name 7: LAQ  Equipment/Resistance 7: 4#  Sets/Reps 7: 20x, with hold                                         Therapy Exercise [id number] 38 minutes and Other Procedure CPT 15 minutes Electrical Stimulation    Total Treatment Time: 53 Minutes    Assessment/Plan   Good tolerance of today's treatment with TE changes. Pt continues to benefit from PT for further progression towards goals.  Progress per Plan of Care             Han Walker, PTA  Physical Therapist    "

## 2017-11-30 ENCOUNTER — TREATMENT (OUTPATIENT)
Dept: PHYSICAL THERAPY | Facility: CLINIC | Age: 37
End: 2017-11-30

## 2017-11-30 DIAGNOSIS — M25.561 ACUTE PAIN OF RIGHT KNEE: Primary | ICD-10-CM

## 2017-11-30 PROCEDURE — 97014 ELECTRIC STIMULATION THERAPY: CPT | Performed by: PHYSICAL THERAPIST

## 2017-11-30 PROCEDURE — 97110 THERAPEUTIC EXERCISES: CPT | Performed by: PHYSICAL THERAPIST

## 2017-11-30 NOTE — PROGRESS NOTES
"Daily Treatment Note    Time In: 12:30     Time Out: 13:30    ICD-10-CM ICD-9-CM   1. Acute pain of right knee M25.561 719.46       Subjective   Pt reports mild knee pain as usual. She reports doing HEP as advised.   Patient reports to therapy 2/10 pain, and 70% improvement.  Attendance  12/12 visits. Recert 12/7. MD f/mckinley HU.      Objective    Amb with mild AG. V cues necessary for correct TE performance. Intermittent c/o burning at patella with TE.  Post treatment pain 2/10.  PROCEDURES AND MODALITIES:     Ice  Ice Applied: Yes  Location: R Knee  Rx Minutes: 15 mins  Ice S/P Rx: Yes    Electrical Stimulation  Stimulation Type: IFC  Location/Electrode Placement/Other: R Knee  Rx Minutes: 15 mins       EXERCISE  Exercise 1  Exercise Name 1: Bike   Time: 9'  Exercise 2  Exercise Name 2: Prone knee flex stretch  Sets/Reps 2: 2/30\" Exercise 3  Exercise Name 3: Seated HS curls  Equipment/Resistance 3: blue tb  Sets/Reps 3: 22x Exercise 4  Exercise Name 4: TG squats  Equipment/Resistance 4: L6  Sets/Reps 4: 2 sets Exercise 5  Exercise Name 5: Step-ups  Sets/Reps 5: 2 sets Exercise 6  Exercise Name 6: SLR  Equipment/Resistance 6: 2#  Sets/Reps 6: 20x   Exercise 7  Exercise Name 7: S/L hip abd  Equipment/Resistance 7: 2#  Sets/Reps 7: 20x Exercise 8  Exercise Name 8: R  SLS  Sets/Reps 8: 1 set,  c/o pain                                         Therapy Exercise [id number] 45 minutes and Other Procedure CPT 15 minutes Electrical Stimulation    Total Treatment Time: 60 Minutes    Assessment/Plan   Fair abdifatah of TE. Some continued patellar reactivity that needs decreasing.   Progress per Plan of Care             Han Walker, PTA  Physical Therapist    "

## 2017-12-05 ENCOUNTER — TREATMENT (OUTPATIENT)
Dept: PHYSICAL THERAPY | Facility: CLINIC | Age: 37
End: 2017-12-05

## 2017-12-05 ENCOUNTER — OFFICE VISIT (OUTPATIENT)
Dept: FAMILY MEDICINE CLINIC | Facility: CLINIC | Age: 37
End: 2017-12-05

## 2017-12-05 VITALS
SYSTOLIC BLOOD PRESSURE: 126 MMHG | WEIGHT: 163 LBS | BODY MASS INDEX: 27.83 KG/M2 | HEIGHT: 64 IN | DIASTOLIC BLOOD PRESSURE: 74 MMHG

## 2017-12-05 DIAGNOSIS — M25.561 ACUTE PAIN OF RIGHT KNEE: Primary | ICD-10-CM

## 2017-12-05 DIAGNOSIS — S83.001S SUBLUXATION OF RIGHT PATELLA, SEQUELA: Primary | ICD-10-CM

## 2017-12-05 DIAGNOSIS — E66.3 OVERWEIGHT (BMI 25.0-29.9): ICD-10-CM

## 2017-12-05 PROBLEM — S83.001A SUBLUXATION OF RIGHT PATELLA: Status: ACTIVE | Noted: 2017-12-05

## 2017-12-05 PROCEDURE — 97014 ELECTRIC STIMULATION THERAPY: CPT | Performed by: PHYSICAL THERAPIST

## 2017-12-05 PROCEDURE — 99214 OFFICE O/P EST MOD 30 MIN: CPT | Performed by: FAMILY MEDICINE

## 2017-12-05 PROCEDURE — 97110 THERAPEUTIC EXERCISES: CPT | Performed by: PHYSICAL THERAPIST

## 2017-12-05 RX ORDER — PHENTERMINE HYDROCHLORIDE 37.5 MG/1
37.5 TABLET ORAL
Qty: 30 TABLET | Refills: 2 | Status: SHIPPED | OUTPATIENT
Start: 2017-12-05 | End: 2018-02-28 | Stop reason: SDUPTHER

## 2017-12-05 NOTE — PROGRESS NOTES
Subjective   IsisTahir Powell is a 37 y.o. female who presents to the office after seeing orthopedics with some significant patellar subluxation.  She's completed a course of physical therapy and is wearing a knee brace.  She is getting some improvement and is hoping to treated nonsurgically for as long as possible.  The orthopedic surgeon recommended some weight loss Take pressure off the knee.  She's been making efforts but has actually gained a few pounds since she's been trying.  She took phentermine in the past with no problems and it was helpful to her in getting started with the weight loss and she would like to try again.    Knee Pain    The incident occurred more than 1 week ago. There was no injury mechanism. The pain is present in the right knee. The quality of the pain is described as aching and stabbing. The pain is at a severity of 6/10. The pain is moderate. The pain has been intermittent since onset. Associated symptoms include a loss of motion. Pertinent negatives include no inability to bear weight, loss of sensation, muscle weakness, numbness or tingling. She reports no foreign bodies present. The symptoms are aggravated by movement and weight bearing. She has tried rest and immobilization for the symptoms. The treatment provided mild relief.        The following portions of the patient's history were reviewed and updated as appropriate: allergies, current medications, past family history, past medical history, past social history, past surgical history and problem list.    Review of Systems   Constitutional: Negative.    HENT: Negative.    Respiratory: Negative.  Negative for shortness of breath.    Cardiovascular: Negative.  Negative for chest pain.   Gastrointestinal: Negative.    Musculoskeletal: Positive for arthralgias and gait problem. Negative for myalgias.   Skin: Negative.    Allergic/Immunologic: Negative for immunocompromised state.   Neurological: Negative for dizziness,  tingling, tremors, seizures, syncope, weakness and numbness.   Hematological: Negative.    Psychiatric/Behavioral: Negative for agitation, confusion, dysphoric mood and sleep disturbance. The patient is not nervous/anxious.    All other systems reviewed and are negative.      Objective   Physical Exam   Constitutional: She is oriented to person, place, and time. She appears well-developed and well-nourished.   HENT:   Head: Normocephalic and atraumatic.   Nose: Nose normal.   Mouth/Throat: Oropharynx is clear and moist.   Eyes: Conjunctivae and EOM are normal. Pupils are equal, round, and reactive to light.   Neck: Normal range of motion. Neck supple. No JVD present. No tracheal deviation present. No thyromegaly present.   Cardiovascular: Normal rate, regular rhythm, normal heart sounds and intact distal pulses.    No murmur heard.  Pulmonary/Chest: Effort normal and breath sounds normal. She has no wheezes.   Abdominal: Soft. Bowel sounds are normal. She exhibits no distension. There is no tenderness.   Musculoskeletal: She exhibits no edema.   Right knee in a hinged brace   Lymphadenopathy:     She has no cervical adenopathy.   Neurological: She is alert and oriented to person, place, and time. Coordination normal.   Skin: Skin is warm and dry. No rash noted.   Several pigmented moles and seborrheic areas noted over the skin   Psychiatric: She has a normal mood and affect.   Nursing note and vitals reviewed.      Assessment/Plan   Charlie was seen today for knee pain.    Diagnoses and all orders for this visit:    Subluxation of right patella, sequela    Overweight (BMI 25.0-29.9)    Other orders  -     phentermine (ADIPEX-P) 37.5 MG tablet; Take 1 tablet by mouth Every Morning Before Breakfast.    The patient has read and signed the [facility] Controlled Substance Contract.  I will continue to see patient for regular follow up appointments. Patient is well controlled on the medication.  RICCARDO has been  reviewed by me and is updated every 3 months. The patient is aware of the potential for addiction and dependence.   Risks of phentermine including but not limited to primary pulmonary hypertension and heart valve damage are discussed with the patient, who verbalized understanding.  We'll go back on phentermine to help with weight loss and she'll follow up with orthopedics as scheduled.  Continue with exercises at home for strengthening and continue to wear her brace

## 2017-12-05 NOTE — PROGRESS NOTES
"PT Discharge Summary    Diagnosis/ICD-10 Code:  Acute pain of right knee [M25.561]  Referring practitioner: Marcus Manzo MD  Date of Initial Visit: 12/5/2017  Patient seen for 13/13 sessions    Time in: 1230 Time out: 1331 Total time: 61 min  Subjective:   Charlie Powell states: pt reports burning and rubbing pain with right knee with weight bearing activities on R LE, 2-3/10 R knee pain with climbing stairs and 5/10 pain performing a deep squat with right knee. Doing HEP twice a day.    Pt reports 80-90% improvement overall while wearing R knee brace, 80% w/out wearing brace   Objective:   Current condition: Fair  Test measurement: MMT: R knee flex/ext 4+/5 (both), R hip add 4+/5 R hip abd 4+/5 AROM: right knee 0-130°     Assessment:   Summary of Treatment:     Ice  Ice Applied: Yes  Location: R Knee  Rx Minutes: 15 mins  Ice S/P Rx: Yes    Electrical Stimulation  Stimulation Type: IFC  Location/Electrode Placement/Other: R Knee  Rx Minutes: 15 mins            EXERCISE-46 min  Exercise 1  Exercise Name 1: Bike   Time: 8'  Exercise 2  Exercise Name 2: S/L R hip abduction  Equipment/Resistance 2:   Exercise 3  Exercise Name 3: S/L hip abduction  Sets/Reps 3: 1/10 Exercise 4  Exercise Name 4: R SLR  Sets/Reps 4: 1/15 Exercise 5  Exercise Name 5: ball bridge  Sets/Reps 5: 1/10, 1/5 Exercise 6  Exercise Name 6: SLR  Sets/Reps 6: 1/15   Exercise 7  Exercise Name 7: resisted TKE w/ blue TB  Sets/Reps 7: 1/20 Exercise 8  Exercise Name 8: step downs 6\"  Sets/Reps 8: 1/10 Exercise 9  Exercise Name 9: unilateral heel raises R  Sets/Reps 9: 1/10 Exercise 10  Exercise Name 10: cone ex: unilateral squat on R LE, L foot pointing toward cones at 12, 10, and 3 o'clock  Time 10: 3' Exercise 11  Exercise Name 11: ROM/MMT reassessment  Time: 3'       Progress toward previous goals: met all goals except STG#3 and LTG#1. Nearing a functional plateau. Pt desiring to discharge at this time and do HEP on her own.     "    Plan:   Goals  Short-term goals:  1.  Patient will have 120° right knee flexion.-MET  2.  Patient will have 0° extension without pain.-MET  3.  Patient will have 5 out of 5 manual muscle test quads and hamstrings without increased discomfort.-PROGRESSING  4.  Patient have LEFS score 40/80.-MET  5.  Patient will be independent in home exercise program.-MET   Long term goals:  1. Decrease right knee pain to 1-2/10 pain with squatting and climbing up stairs.-PARTIALLY MET  2. Improve right hip add MMT to 4-/5 level.MET  3. Improve right hip abd MMT to 4+/5.-MET    Timeframe: N/A  Prognosis to achieve goals: N/A    Plan  Treatment plan with rationale: pt discharged to care of self.   Recommendations: Pt instructed to continue HEP as shown.    PT Signature: Darrick Crockett PT      Based upon review of the patient's progress and desire to d/c from skilled PT, it is my medical opinion that Charlie Powell should discharge from physical therapy treatment at [facility] PHYSICAL THERAP[facility]  [address] [place] [address] Dr Kenyatta [place] [id number]  [phone].    Signature:  Marcus Manzo MD

## 2018-02-28 ENCOUNTER — OFFICE VISIT (OUTPATIENT)
Dept: FAMILY MEDICINE CLINIC | Facility: CLINIC | Age: 38
End: 2018-02-28

## 2018-02-28 VITALS
BODY MASS INDEX: 33.84 KG/M2 | HEIGHT: 55 IN | SYSTOLIC BLOOD PRESSURE: 132 MMHG | DIASTOLIC BLOOD PRESSURE: 84 MMHG | WEIGHT: 146.2 LBS

## 2018-02-28 DIAGNOSIS — E66.3 OVERWEIGHT (BMI 25.0-29.9): Primary | ICD-10-CM

## 2018-02-28 DIAGNOSIS — Z71.84 TRAVEL ADVICE ENCOUNTER: ICD-10-CM

## 2018-02-28 DIAGNOSIS — Z88.9 HX OF ALLERGIC REACTION: ICD-10-CM

## 2018-02-28 PROCEDURE — 99214 OFFICE O/P EST MOD 30 MIN: CPT | Performed by: FAMILY MEDICINE

## 2018-02-28 RX ORDER — PENICILLIN V POTASSIUM 500 MG/1
500 TABLET ORAL 3 TIMES DAILY
Qty: 30 TABLET | Refills: 1 | Status: SHIPPED | OUTPATIENT
Start: 2018-02-28 | End: 2019-11-25

## 2018-02-28 RX ORDER — CIPROFLOXACIN 500 MG/1
TABLET, FILM COATED ORAL
Qty: 2 TABLET | Refills: 1 | Status: SHIPPED | OUTPATIENT
Start: 2018-02-28 | End: 2018-06-20

## 2018-02-28 RX ORDER — PHENTERMINE HYDROCHLORIDE 37.5 MG/1
37.5 TABLET ORAL
Qty: 30 TABLET | Refills: 2 | Status: SHIPPED | OUTPATIENT
Start: 2018-02-28 | End: 2018-06-20 | Stop reason: SDUPTHER

## 2018-02-28 RX ORDER — PENICILLIN V POTASSIUM 500 MG/1
TABLET ORAL
Refills: 1 | COMMUNITY
Start: 2018-01-08 | End: 2018-02-28 | Stop reason: SDUPTHER

## 2018-02-28 RX ORDER — FEXOFENADINE HCL AND PSEUDOEPHEDRINE HCI 180; 240 MG/1; MG/1
1 TABLET, EXTENDED RELEASE ORAL DAILY
Qty: 30 TABLET | Refills: 5 | Status: SHIPPED | OUTPATIENT
Start: 2018-02-28 | End: 2019-02-19 | Stop reason: SDUPTHER

## 2018-02-28 RX ORDER — EPINEPHRINE 0.3 MG/.3ML
0.3 INJECTION SUBCUTANEOUS ONCE
Qty: 1 EACH | Refills: 1 | Status: SHIPPED | OUTPATIENT
Start: 2018-02-28 | End: 2018-02-28

## 2018-02-28 NOTE — PROGRESS NOTES
Subjective   IsisTahir Powell is a 38 y.o. female who presents to the office for follow-up on weight issues.  She's done well with the phentermine and would like to continue with it.  Also needs a note regarding her EpiPen as she is going to be traveling to [place] and requires this with her at all times.  Also the dentist recently gave her a prescription for penicillin and she would like another to keep on hand for when symptoms occur.    History of Present Illness       Review of Systems   Constitutional: Negative.    HENT: Negative.    Respiratory: Negative.  Negative for shortness of breath.    Cardiovascular: Negative.  Negative for chest pain.   Gastrointestinal: Negative.    Musculoskeletal: Negative.  Negative for myalgias.   Skin: Negative.    Allergic/Immunologic: Negative for immunocompromised state.   Neurological: Negative for dizziness, tremors, seizures, syncope, weakness and numbness.   Hematological: Negative.    Psychiatric/Behavioral: Negative for agitation, confusion, dysphoric mood and sleep disturbance. The patient is not nervous/anxious.    All other systems reviewed and are negative.      Objective   Physical Exam   Constitutional: She is oriented to person, place, and time. She appears well-developed and well-nourished.   HENT:   Head: Normocephalic and atraumatic.   Nose: Nose normal.   Mouth/Throat: Oropharynx is clear and moist.   Eyes: Conjunctivae and EOM are normal. Pupils are equal, round, and reactive to light.   Neck: Normal range of motion. Neck supple. No JVD present. No tracheal deviation present. No thyromegaly present.   Cardiovascular: Normal rate, regular rhythm, normal heart sounds and intact distal pulses.    No murmur heard.  Pulmonary/Chest: Effort normal and breath sounds normal. She has no wheezes.   Abdominal: Soft. Bowel sounds are normal. She exhibits no distension. There is no tenderness.   Musculoskeletal: Normal range of motion. She exhibits no edema.    Lymphadenopathy:     She has no cervical adenopathy.   Neurological: She is alert and oriented to person, place, and time. Coordination normal.   Skin: Skin is warm and dry. No rash noted.   Several pigmented moles and seborrheic areas noted over the skin   Psychiatric: She has a normal mood and affect.   Nursing note and vitals reviewed.      Assessment/Plan   Charlie was seen today for follow-up and med refill.    Diagnoses and all orders for this visit:    Overweight (BMI 25.0-29.9)    Travel advice encounter    Hx of allergic reaction    Other orders  -     penicillin v potassium (VEETID) 500 MG tablet; Take 1 tablet by mouth 3 (Three) Times a Day.  -     fexofenadine-pseudoephedrine (ALLEGRA-D ALLERGY & CONGESTION) 180-240 MG per 24 hr tablet; Take 1 tablet by mouth Daily.  -     EPINEPHrine (EPIPEN 2-MERCEDES) 0.3 MG/0.3ML solution auto-injector injection; Inject 0.3 mL under the skin 1 (One) Time for 1 dose.  -     phentermine (ADIPEX-P) 37.5 MG tablet; Take 1 tablet by mouth Every Morning Before Breakfast.  -     ciprofloxacin (CIPRO) 500 MG tablet; 1 tablet if needed for traveler's diarrhea    The patient has read and signed the [facility] Controlled Substance Contract.  I will continue to see patient for regular follow up appointments. Patient is well controlled on the medication.  RICCARDO has been reviewed by me and is updated every 3 months. The patient is aware of the potential for addiction and dependence.   Risks of phentermine including but not limited to primary pulmonary hypertension and heart valve damage are discussed with the patient, who verbalized understanding.   Continue phentermine along with diet and exercise    Continue with the EpiPen if needed for severe allergy attacks, Allegra-D for allergies    Cipro given for her to take on her trip for travelers diarrhea, if needed    Refilled penicillin for dental issues.          This document has been electronically signed by Reyna Juan  MD on February 28, 2018 12:41 PM

## 2018-05-17 RX ORDER — PHENTERMINE HYDROCHLORIDE 37.5 MG/1
TABLET ORAL
Qty: 30 TABLET | Refills: 2 | OUTPATIENT
Start: 2018-05-17

## 2018-05-18 ENCOUNTER — TRANSCRIBE ORDERS (OUTPATIENT)
Dept: GENERAL RADIOLOGY | Facility: CLINIC | Age: 38
End: 2018-05-18

## 2018-05-18 DIAGNOSIS — Z12.39 SCREENING BREAST EXAMINATION: Primary | ICD-10-CM

## 2018-06-20 ENCOUNTER — OFFICE VISIT (OUTPATIENT)
Dept: FAMILY MEDICINE CLINIC | Facility: CLINIC | Age: 38
End: 2018-06-20

## 2018-06-20 VITALS
WEIGHT: 145 LBS | SYSTOLIC BLOOD PRESSURE: 114 MMHG | HEIGHT: 64 IN | DIASTOLIC BLOOD PRESSURE: 70 MMHG | BODY MASS INDEX: 24.75 KG/M2

## 2018-06-20 DIAGNOSIS — E66.3 OVERWEIGHT: Primary | ICD-10-CM

## 2018-06-20 DIAGNOSIS — H69.82 DYSFUNCTION OF LEFT EUSTACHIAN TUBE: ICD-10-CM

## 2018-06-20 PROCEDURE — 99213 OFFICE O/P EST LOW 20 MIN: CPT | Performed by: FAMILY MEDICINE

## 2018-06-20 RX ORDER — PHENTERMINE HYDROCHLORIDE 37.5 MG/1
37.5 TABLET ORAL
Qty: 30 TABLET | Refills: 2 | Status: SHIPPED | OUTPATIENT
Start: 2018-06-20 | End: 2018-09-27 | Stop reason: SDUPTHER

## 2018-06-20 RX ORDER — MOMETASONE FUROATE 50 UG/1
2 SPRAY, METERED NASAL DAILY
Qty: 17 G | Refills: 5 | Status: SHIPPED | OUTPATIENT
Start: 2018-06-20 | End: 2021-03-11

## 2018-06-20 NOTE — PROGRESS NOTES
Subjective   IsisTahir Powell is a 38 y.o. female who presents to the office for follow-up on weight issues.  She's done well with the phentermine and would like to continue with it.     Has pressure in her left ear again.  She has had sinus surgery on the right side she has Allegra-D and has a prescription for Nasonex but has been out and not using it.    History of Present Illness       Review of Systems   Constitutional: Negative.    HENT: Negative.    Respiratory: Negative.  Negative for shortness of breath.    Cardiovascular: Negative.  Negative for chest pain.   Gastrointestinal: Negative.    Musculoskeletal: Negative.  Negative for myalgias.   Skin: Negative.    Allergic/Immunologic: Negative for immunocompromised state.   Neurological: Negative for dizziness, tremors, seizures, syncope, weakness and numbness.   Hematological: Negative.    Psychiatric/Behavioral: Negative for agitation, confusion, dysphoric mood and sleep disturbance. The patient is not nervous/anxious.    All other systems reviewed and are negative.      Objective   Physical Exam   Constitutional: She is oriented to person, place, and time. She appears well-developed and well-nourished.   HENT:   Head: Normocephalic and atraumatic.   Nose: Nose normal.   Mouth/Throat: Oropharynx is clear and moist.   Slight retraction, effusion of left tympanic membrane   Eyes: Conjunctivae and EOM are normal. Pupils are equal, round, and reactive to light.   Neck: Normal range of motion. Neck supple. No JVD present. No tracheal deviation present. No thyromegaly present.   Cardiovascular: Normal rate, regular rhythm, normal heart sounds and intact distal pulses.    No murmur heard.  Pulmonary/Chest: Effort normal and breath sounds normal. She has no wheezes.   Abdominal: Soft. Bowel sounds are normal. She exhibits no distension. There is no tenderness.   Musculoskeletal: Normal range of motion. She exhibits no edema.   Lymphadenopathy:     She has  no cervical adenopathy.   Neurological: She is alert and oriented to person, place, and time. Coordination normal.   Skin: Skin is warm and dry. No rash noted.       Psychiatric: She has a normal mood and affect.   Nursing note and vitals reviewed.      Assessment/Plan   Charlie was seen today for med refill.    Diagnoses and all orders for this visit:    Overweight    Dysfunction of left eustachian tube    Other orders  -     phentermine (ADIPEX-P) 37.5 MG tablet; Take 1 tablet by mouth Every Morning Before Breakfast.  -     mometasone (NASONEX) 50 MCG/ACT nasal spray; 2 sprays into each nostril Daily.    The patient has read and signed the [facility] Controlled Substance Contract.  I will continue to see patient for regular follow up appointments. Patient is well controlled on the medication.  RICCARDO has been reviewed by me and is updated every 3 months. The patient is aware of the potential for addiction and dependence.   Risks of phentermine including but not limited to primary pulmonary hypertension and heart valve damage are discussed with the patient, who verbalized understanding.   Continue phentermine along with diet and exercise    Refilled Nasonex for eustachian tube dysfunction on the left side.  She's also to continue Allegra-D.                   This document has been electronically signed by Reyna Juan MD on June 20, 2018 8:16 AM

## 2018-07-09 DIAGNOSIS — T14.90XA INJURY: ICD-10-CM
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
## 2018-08-31 RX ORDER — PHENTERMINE HYDROCHLORIDE 37.5 MG/1
37.5 TABLET ORAL
Qty: 30 TABLET | Refills: 2 | OUTPATIENT
Start: 2018-08-31

## 2018-09-27 ENCOUNTER — OFFICE VISIT (OUTPATIENT)
Dept: FAMILY MEDICINE CLINIC | Facility: CLINIC | Age: 38
End: 2018-09-27

## 2018-09-27 VITALS
WEIGHT: 149.2 LBS | SYSTOLIC BLOOD PRESSURE: 120 MMHG | DIASTOLIC BLOOD PRESSURE: 72 MMHG | HEIGHT: 64 IN | BODY MASS INDEX: 25.47 KG/M2

## 2018-09-27 DIAGNOSIS — E66.3 OVERWEIGHT: Primary | ICD-10-CM

## 2018-09-27 PROCEDURE — 99213 OFFICE O/P EST LOW 20 MIN: CPT | Performed by: FAMILY MEDICINE

## 2018-09-27 RX ORDER — PHENTERMINE HYDROCHLORIDE 37.5 MG/1
37.5 TABLET ORAL
Qty: 30 TABLET | Refills: 2 | Status: SHIPPED | OUTPATIENT
Start: 2018-09-27 | End: 2019-02-19 | Stop reason: SDUPTHER

## 2018-09-27 RX ORDER — TOPIRAMATE 25 MG/1
25 TABLET ORAL DAILY
Qty: 30 TABLET | Refills: 5 | Status: SHIPPED | OUTPATIENT
Start: 2018-09-27 | End: 2019-02-19

## 2018-09-27 NOTE — PROGRESS NOTES
Subjective   IsisTahir Powell is a 38 y.o. female she is here for follow-up on weight issues but she's been on phentermine and successfully lost weight but has gained a bit of it back.  She like to go back on the phentermine for just a bit longer.  She also has problems has taken Topamax in the past.  She drinks a lot of pop and this helps curb the appetite for the pop as well.    History of Present Illness       Review of Systems   Constitutional: Negative.    HENT: Negative.    Respiratory: Negative.  Negative for shortness of breath.    Cardiovascular: Negative.  Negative for chest pain.   Gastrointestinal: Negative.    Musculoskeletal: Negative.  Negative for myalgias.   Skin: Negative.    Allergic/Immunologic: Negative for immunocompromised state.   Neurological: Negative for dizziness, tremors, seizures, syncope, weakness and numbness.   Hematological: Negative.    Psychiatric/Behavioral: Negative for agitation, confusion, dysphoric mood and sleep disturbance. The patient is not nervous/anxious.    All other systems reviewed and are negative.      Objective   Physical Exam   Constitutional: She is oriented to person, place, and time. She appears well-developed and well-nourished.   HENT:   Head: Normocephalic and atraumatic.   Nose: Nose normal.   Mouth/Throat: Oropharynx is clear and moist.       Eyes: Pupils are equal, round, and reactive to light. Conjunctivae and EOM are normal.   Neck: Normal range of motion. Neck supple. No JVD present. No tracheal deviation present. No thyromegaly present.   Cardiovascular: Normal rate, regular rhythm, normal heart sounds and intact distal pulses.    No murmur heard.  Pulmonary/Chest: Effort normal and breath sounds normal. She has no wheezes.   Abdominal: Soft. Bowel sounds are normal. She exhibits no distension. There is no tenderness.   Musculoskeletal: Normal range of motion. She exhibits no edema.   Lymphadenopathy:     She has no cervical adenopathy.    Neurological: She is alert and oriented to person, place, and time. Coordination normal.   Skin: Skin is warm and dry. No rash noted.       Psychiatric: She has a normal mood and affect.   Nursing note and vitals reviewed.      Assessment/Plan   Charlie was seen today for med refill.    Diagnoses and all orders for this visit:    Overweight    Other orders  -     phentermine (ADIPEX-P) 37.5 MG tablet; Take 1 tablet by mouth Every Morning Before Breakfast.  -     topiramate (TOPAMAX) 25 MG tablet; Take 1 tablet by mouth Daily.    The patient has read and signed the [facility] Controlled Substance Contract.  I will continue to see patient for regular follow up appointments. Patient is well controlled on the medication.  RICCARDO has been reviewed by me and is updated every 3 months. The patient is aware of the potential for addiction and dependence.   Risks of phentermine including but not limited to primary pulmonary hypertension and heart valve damage are discussed with the patient, who verbalized understanding.    Continue phentermine along with diet and exercise        This document has been electronically signed by Reyna Juan MD on September 27, 2018 4:43 PM

## 2019-01-29 RX ORDER — PHENTERMINE HYDROCHLORIDE 37.5 MG/1
37.5 TABLET ORAL
Qty: 30 TABLET | Refills: 2 | OUTPATIENT
Start: 2019-01-29

## 2019-02-19 ENCOUNTER — OFFICE VISIT (OUTPATIENT)
Dept: FAMILY MEDICINE CLINIC | Facility: CLINIC | Age: 39
End: 2019-02-19

## 2019-02-19 VITALS
BODY MASS INDEX: 27.14 KG/M2 | SYSTOLIC BLOOD PRESSURE: 138 MMHG | DIASTOLIC BLOOD PRESSURE: 90 MMHG | HEIGHT: 64 IN | WEIGHT: 159 LBS

## 2019-02-19 DIAGNOSIS — R50.9 FEVER, UNSPECIFIED FEVER CAUSE: Primary | ICD-10-CM

## 2019-02-19 DIAGNOSIS — E66.3 OVERWEIGHT (BMI 25.0-29.9): ICD-10-CM

## 2019-02-19 LAB
FLUAV AG NPH QL: NEGATIVE
FLUBV AG NPH QL IA: NEGATIVE

## 2019-02-19 PROCEDURE — 99214 OFFICE O/P EST MOD 30 MIN: CPT | Performed by: FAMILY MEDICINE

## 2019-02-19 PROCEDURE — 87804 INFLUENZA ASSAY W/OPTIC: CPT | Performed by: FAMILY MEDICINE

## 2019-02-19 RX ORDER — ONDANSETRON 4 MG/1
4 TABLET, FILM COATED ORAL EVERY 6 HOURS PRN
Qty: 30 TABLET | Refills: 1 | Status: SHIPPED | OUTPATIENT
Start: 2019-02-19 | End: 2020-06-30 | Stop reason: SDUPTHER

## 2019-02-19 RX ORDER — FEXOFENADINE HCL AND PSEUDOEPHEDRINE HCI 180; 240 MG/1; MG/1
1 TABLET, EXTENDED RELEASE ORAL DAILY
Qty: 30 TABLET | Refills: 5 | Status: SHIPPED | OUTPATIENT
Start: 2019-02-19 | End: 2022-07-26

## 2019-02-19 RX ORDER — PHENTERMINE HYDROCHLORIDE 37.5 MG/1
37.5 TABLET ORAL
Qty: 30 TABLET | Refills: 2 | Status: SHIPPED | OUTPATIENT
Start: 2019-02-19 | End: 2019-07-10 | Stop reason: SDUPTHER

## 2019-02-19 NOTE — PROGRESS NOTES
Subjective   Charlie Powell is a 39 y.o. female she is here for a few concerns.  She has not felt well in the last few days.  She has been achy with low-grade fevers and pain behind her left ear.  She has a history of a lot of sinus issues and sinus surgery.  She will be flying in a couple of weeks on a trip and is concerned about this obviously.  Also would like to refill her regular medicines including phentermine to help with continued weight loss.    Fever    This is a new problem. The current episode started in the past 7 days. The problem occurs intermittently. The problem has been waxing and waning. The maximum temperature noted was 100 to 100.9 F. The temperature was taken using an oral thermometer. Associated symptoms include muscle aches. Pertinent negatives include no chest pain. She has tried nothing for the symptoms.          Review of Systems   Constitutional: Positive for fever.   HENT: Negative.    Respiratory: Negative.  Negative for shortness of breath.    Cardiovascular: Negative.  Negative for chest pain.   Gastrointestinal: Negative.    Musculoskeletal: Negative.  Negative for myalgias.   Skin: Negative.    Allergic/Immunologic: Negative for immunocompromised state.   Neurological: Negative for dizziness, tremors, seizures, syncope, weakness and numbness.   Hematological: Negative.    Psychiatric/Behavioral: Negative for agitation, confusion, dysphoric mood and sleep disturbance. The patient is not nervous/anxious.    All other systems reviewed and are negative.      Objective   Physical Exam   Constitutional: She is oriented to person, place, and time. She appears well-developed and well-nourished.   HENT:   Head: Normocephalic and atraumatic.   Nose: Nose normal.   Mouth/Throat: Oropharynx is clear and moist.       Eyes: Conjunctivae and EOM are normal. Pupils are equal, round, and reactive to light.   Neck: Normal range of motion. Neck supple. No JVD present. No tracheal  deviation present. No thyromegaly present.   Cardiovascular: Normal rate, regular rhythm, normal heart sounds and intact distal pulses.   No murmur heard.  Pulmonary/Chest: Effort normal and breath sounds normal. She has no wheezes.   Abdominal: Soft. Bowel sounds are normal. She exhibits no distension. There is no tenderness.   Musculoskeletal: Normal range of motion. She exhibits no edema.   Lymphadenopathy:     She has no cervical adenopathy.   Neurological: She is alert and oriented to person, place, and time. Coordination normal.   Skin: Skin is warm and dry. No rash noted.       Psychiatric: She has a normal mood and affect.   Nursing note and vitals reviewed.      Assessment/Plan   Charlie was seen today for fever.    Diagnoses and all orders for this visit:    Fever, unspecified fever cause  -     Influenza Antigen, Rapid - Swab, Nasopharynx    Overweight (BMI 25.0-29.9)    Other orders  -     phentermine (ADIPEX-P) 37.5 MG tablet; Take 1 tablet by mouth Every Morning Before Breakfast.  -     ondansetron (ZOFRAN) 4 MG tablet; Take 1 tablet by mouth Every 6 (Six) Hours As Needed for Nausea or Vomiting.  -     fexofenadine-pseudoephedrine (ALLEGRA-D ALLERGY & CONGESTION) 180-240 MG per 24 hr tablet; Take 1 tablet by mouth Daily.    The patient has read and signed the [facility] Controlled Substance Contract.  I will continue to see patient for regular follow up appointments. Patient is well controlled on the medication.  RICCARDO has been reviewed by me and is updated every 3 months. The patient is aware of the potential for addiction and dependence.   Risks of phentermine including but not limited to primary pulmonary hypertension and heart valve damage are discussed with the patient, who verbalized understanding.    Continue phentermine along with diet and exercise    Get a flu swab today and follow-up accordingly.  Consider treatment with antibiotics if negative given her history of chronic sinusitis and  sinus surgery.        This document has been electronically signed by Reyna Juan MD on February 19, 2019 2:58 PM

## 2019-04-19 ENCOUNTER — TRANSCRIBE ORDERS (OUTPATIENT)
Dept: GENERAL RADIOLOGY | Facility: CLINIC | Age: 39
End: 2019-04-19

## 2019-04-19 DIAGNOSIS — Z12.39 SCREENING BREAST EXAMINATION: Primary | ICD-10-CM

## 2019-07-10 ENCOUNTER — OFFICE VISIT (OUTPATIENT)
Dept: FAMILY MEDICINE CLINIC | Facility: CLINIC | Age: 39
End: 2019-07-10

## 2019-07-10 VITALS
BODY MASS INDEX: 28.17 KG/M2 | SYSTOLIC BLOOD PRESSURE: 116 MMHG | DIASTOLIC BLOOD PRESSURE: 80 MMHG | WEIGHT: 165 LBS | HEIGHT: 64 IN

## 2019-07-10 DIAGNOSIS — M25.561 CHRONIC PAIN OF RIGHT KNEE: ICD-10-CM

## 2019-07-10 DIAGNOSIS — E66.3 OVERWEIGHT: Primary | ICD-10-CM

## 2019-07-10 DIAGNOSIS — G89.29 CHRONIC PAIN OF RIGHT KNEE: ICD-10-CM

## 2019-07-10 PROCEDURE — 99214 OFFICE O/P EST MOD 30 MIN: CPT | Performed by: FAMILY MEDICINE

## 2019-07-10 RX ORDER — PHENTERMINE HYDROCHLORIDE 37.5 MG/1
37.5 TABLET ORAL
Qty: 30 TABLET | Refills: 2 | Status: SHIPPED | OUTPATIENT
Start: 2019-07-10 | End: 2019-10-31 | Stop reason: SDUPTHER

## 2019-07-10 RX ORDER — IBUPROFEN 800 MG/1
800 TABLET ORAL 3 TIMES DAILY PRN
Qty: 90 TABLET | Refills: 5 | Status: SHIPPED | OUTPATIENT
Start: 2019-07-10 | End: 2021-03-01 | Stop reason: SDUPTHER
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
## 2019-09-27 RX ORDER — PHENTERMINE HYDROCHLORIDE 37.5 MG/1
37.5 TABLET ORAL
Qty: 30 TABLET | Refills: 2 | OUTPATIENT
Start: 2019-09-27

## 2019-10-31 ENCOUNTER — OFFICE VISIT (OUTPATIENT)
Dept: FAMILY MEDICINE CLINIC | Facility: CLINIC | Age: 39
End: 2019-10-31

## 2019-10-31 VITALS
SYSTOLIC BLOOD PRESSURE: 150 MMHG | BODY MASS INDEX: 27.72 KG/M2 | DIASTOLIC BLOOD PRESSURE: 96 MMHG | HEIGHT: 64 IN | WEIGHT: 162.4 LBS

## 2019-10-31 DIAGNOSIS — E66.3 OVERWEIGHT: Primary | ICD-10-CM

## 2019-10-31 DIAGNOSIS — G89.29 CHRONIC PAIN OF RIGHT KNEE: ICD-10-CM

## 2019-10-31 DIAGNOSIS — M25.561 CHRONIC PAIN OF RIGHT KNEE: ICD-10-CM

## 2019-10-31 PROCEDURE — 20610 DRAIN/INJ JOINT/BURSA W/O US: CPT | Performed by: FAMILY MEDICINE

## 2019-10-31 PROCEDURE — 99214 OFFICE O/P EST MOD 30 MIN: CPT | Performed by: FAMILY MEDICINE

## 2019-10-31 RX ORDER — PHENTERMINE HYDROCHLORIDE 37.5 MG/1
37.5 TABLET ORAL
Qty: 30 TABLET | Refills: 2 | Status: SHIPPED | OUTPATIENT
Start: 2019-10-31 | End: 2020-03-11 | Stop reason: SDUPTHER

## 2019-10-31 RX ORDER — TRIAMCINOLONE ACETONIDE 40 MG/ML
80 INJECTION, SUSPENSION INTRA-ARTICULAR; INTRAMUSCULAR ONCE
Status: COMPLETED | OUTPATIENT
Start: 2019-10-31 | End: 2019-10-31

## 2019-10-31 RX ADMIN — TRIAMCINOLONE ACETONIDE 80 MG: 40 INJECTION, SUSPENSION INTRA-ARTICULAR; INTRAMUSCULAR at 14:58

## 2019-10-31 NOTE — PROGRESS NOTES
Subjective   IsisTahir Powell is a 39 y.o. female she is here for a few concerns.  She has a history of some chronic right knee problems and has seen orthopedics.  Surgery has been recommended as has physical therapy.  She tries to remain active but has been having more pain.  An injection in the knee joint helped and she would like to try this again.  Is been about a year since her last one.  She has joined a gym and has a .  The knee pain is limiting her sometimes.  She would like to try the phentermine again as this does help take significant pressure off of her knee.    History of Present Illness        Review of Systems   HENT: Negative.    Respiratory: Negative.  Negative for shortness of breath.    Cardiovascular: Negative.    Gastrointestinal: Negative.    Musculoskeletal: Positive for arthralgias. Negative for myalgias.   Skin: Negative.    Allergic/Immunologic: Negative for immunocompromised state.   Neurological: Negative for dizziness, tremors, seizures, syncope and weakness.   Hematological: Negative.    Psychiatric/Behavioral: Negative for agitation, confusion, dysphoric mood and sleep disturbance. The patient is not nervous/anxious.    All other systems reviewed and are negative.      Objective   Physical Exam   Constitutional: She is oriented to person, place, and time. She appears well-developed and well-nourished.   HENT:   Head: Normocephalic and atraumatic.   Nose: Nose normal.   Mouth/Throat: Oropharynx is clear and moist.       Eyes: Conjunctivae and EOM are normal. Pupils are equal, round, and reactive to light.   Neck: Normal range of motion. Neck supple. No JVD present. No tracheal deviation present. No thyromegaly present.   Cardiovascular: Normal rate, regular rhythm, normal heart sounds and intact distal pulses.   No murmur heard.  Pulmonary/Chest: Effort normal and breath sounds normal. She has no wheezes.   Abdominal: Soft. Bowel sounds are normal. She  exhibits no distension. There is no tenderness.   Musculoskeletal: Normal range of motion. She exhibits no edema.   Tenderness over right knee anteriorly, she is wearing a brace with a patellar cut   Lymphadenopathy:     She has no cervical adenopathy.   Neurological: She is alert and oriented to person, place, and time. Coordination normal.   Skin: Skin is warm and dry. No rash noted.       Psychiatric: She has a normal mood and affect.   Nursing note and vitals reviewed.    After anesthesia with 1% lidocaine locally at the needle insertion site, the knee is prepped, cleaned with Betadine, and draped in a sterile manner.  Aspiration of the knee is attempted.  With the needle still in place, syringe is changed and injection into the knee joint with Kenalog 40 mg per mL, 2 mls and lidocaine 1%, 1 mL is given. Patient tolerated procedure well.      Assessment/Plan   Charlie was seen today for knee pain.    Diagnoses and all orders for this visit:    Overweight    Chronic pain of right knee  -     triamcinolone acetonide (KENALOG-40) injection 80 mg    Other orders  -     phentermine (ADIPEX-P) 37.5 MG tablet; Take 1 tablet by mouth Every Morning Before Breakfast.    The patient has read and signed the [facility] Controlled Substance Contract.  I will continue to see patient for regular follow up appointments. Patient is well controlled on the medication.  RICCARDO has been reviewed by me and is updated every 3 months. The patient is aware of the potential for addiction and dependence.   Risks of phentermine including but not limited to primary pulmonary hypertension and heart valve damage are discussed with the patient, who verbalized understanding.  We will go back on phentermine and recheck in 3 months    Follow-up with orthopedics, continue with exercise.  Contact me when 2 to 3 days of joint injection not improving symptoms          This document has been electronically signed by Reyna Juan MD on October  31, 2019 2:56 PM

## 2019-11-25 RX ORDER — CIPROFLOXACIN 500 MG/1
500 TABLET, FILM COATED ORAL 2 TIMES DAILY
Qty: 20 TABLET | Refills: 0 | Status: SHIPPED | OUTPATIENT
Start: 2019-11-25 | End: 2020-06-08

## 2020-03-11 ENCOUNTER — OFFICE VISIT (OUTPATIENT)
Dept: FAMILY MEDICINE CLINIC | Facility: CLINIC | Age: 40
End: 2020-03-11

## 2020-03-11 VITALS
HEIGHT: 64 IN | DIASTOLIC BLOOD PRESSURE: 88 MMHG | WEIGHT: 165.6 LBS | SYSTOLIC BLOOD PRESSURE: 136 MMHG | BODY MASS INDEX: 28.27 KG/M2

## 2020-03-11 DIAGNOSIS — Z23 NEED FOR VACCINATION: ICD-10-CM

## 2020-03-11 DIAGNOSIS — E66.3 OVERWEIGHT: Primary | ICD-10-CM

## 2020-03-11 PROCEDURE — 90715 TDAP VACCINE 7 YRS/> IM: CPT | Performed by: FAMILY MEDICINE

## 2020-03-11 PROCEDURE — 99213 OFFICE O/P EST LOW 20 MIN: CPT | Performed by: FAMILY MEDICINE

## 2020-03-11 PROCEDURE — 90471 IMMUNIZATION ADMIN: CPT | Performed by: FAMILY MEDICINE

## 2020-03-11 RX ORDER — PHENTERMINE HYDROCHLORIDE 37.5 MG/1
37.5 TABLET ORAL
Qty: 30 TABLET | Refills: 2 | Status: SHIPPED | OUTPATIENT
Start: 2020-03-11 | End: 2020-06-08

## 2020-03-11 NOTE — PROGRESS NOTES
"  Subjective   Isis'Clotilde Powell is a 40 y.o. female she is here for a few concerns.  She is still having a lot of pain in her knee and is seeing a .  She is going to the gym and working out regularly, and trying to watch her diet but has not been as successful as she has hoped with weight loss and would like to get back on the phentermine for a while.  She also has not had labs done in quite a while.  She is due for her Tdap vaccine.  History of Present Illness    Overweight  This is a chronic problem. The current episode started more than 1 year ago. The problem occurs constantly. The problem has been gradually worsening. Associated symptoms include arthralgias and fatigue. Pertinent negatives include no abdominal pain, anorexia, change in bowel habit, chest pain, chills, congestion, coughing, diaphoresis, fever, headaches, joint swelling, myalgias, nausea, neck pain, numbness, rash, sore throat, swollen glands, urinary symptoms, vertigo, visual change, vomiting or weakness. The symptoms are aggravated by eating. Treatments tried: diet, exercise. The treatment provided mild relief.       Review of Systems   HENT: Negative.    Respiratory: Negative.  Negative for shortness of breath.    Cardiovascular: Negative.    Gastrointestinal: Negative.    Musculoskeletal: Positive for arthralgias. Negative for myalgias.   Skin: Negative.    Allergic/Immunologic: Negative for immunocompromised state.   Neurological: Negative for dizziness, tremors, seizures, syncope and weakness.   Hematological: Negative.    Psychiatric/Behavioral: Negative for agitation, confusion, dysphoric mood and sleep disturbance. The patient is not nervous/anxious.    All other systems reviewed and are negative.      Objective    Vitals:    03/11/20 0802   BP: 136/88   Weight: 75.1 kg (165 lb 9.6 oz)   Height: 162.6 cm (64\")   PainSc:   3   PainLoc: Knee  Comment: right     Body mass index is 28.43 kg/m².    Physical Exam "   Constitutional: She is oriented to person, place, and time. She appears well-developed and well-nourished.   HENT:   Head: Normocephalic and atraumatic.   Nose: Nose normal.   Mouth/Throat: Oropharynx is clear and moist.       Eyes: Pupils are equal, round, and reactive to light. Conjunctivae and EOM are normal.   Neck: Normal range of motion. Neck supple. No JVD present. No tracheal deviation present. No thyromegaly present.   Cardiovascular: Normal rate, regular rhythm, normal heart sounds and intact distal pulses.   No murmur heard.  Pulmonary/Chest: Effort normal and breath sounds normal. She has no wheezes.   Abdominal: Soft. Bowel sounds are normal. She exhibits no distension. There is no tenderness.   Musculoskeletal: Normal range of motion. She exhibits no edema.   Tenderness over right knee anteriorly, she is wearing a brace with a patellar cut   Lymphadenopathy:     She has no cervical adenopathy.   Neurological: She is alert and oriented to person, place, and time. Coordination normal.   Skin: Skin is warm and dry. No rash noted.       Psychiatric: She has a normal mood and affect.   Nursing note and vitals reviewed.    Assessment/Plan   Charlie was seen today for med refill.    Diagnoses and all orders for this visit:    Overweight  -     phentermine (ADIPEX-P) 37.5 MG tablet; Take 1 tablet by mouth Every Morning Before Breakfast.  -     Lipid Panel; Future  -     Comprehensive Metabolic Panel  -     CBC & Differential; Future  -     T4, Free  -     TSH  -     Vitamin B12 & Folate  -     Vitamin D 25 Hydroxy    Need for vaccination  -     Tdap Vaccine Greater Than or Equal To 8yo IM    The patient has read and signed the [facility] Controlled Substance Contract.  I will continue to see patient for regular follow up appointments. Patient is well controlled on the medication.  RICCARDO has been reviewed by me and is updated every 3 months. The patient is aware of the potential for addiction and  dependence.   Risks of phentermine including but not limited to primary pulmonary hypertension and heart valve damage are discussed with the patient, who verbalized understanding.  We will go back on phentermine and recheck in 3 months    We will go back on the phentermine and she will continue with diet and exercise efforts.  Will return fasting for labs.    Tdap vaccine given today          This document has been electronically signed by Reyna Juan MD on March 11, 2020 08:28

## 2020-06-08 ENCOUNTER — OFFICE VISIT (OUTPATIENT)
Dept: FAMILY MEDICINE CLINIC | Facility: CLINIC | Age: 40
End: 2020-06-08

## 2020-06-08 DIAGNOSIS — R41.840 ATTENTION DEFICIT: Primary | ICD-10-CM

## 2020-06-08 PROCEDURE — 99213 OFFICE O/P EST LOW 20 MIN: CPT | Performed by: FAMILY MEDICINE

## 2020-06-08 NOTE — PROGRESS NOTES
Subjective   IsisTahir Powell is a 40 y.o. female.   This visit has been rescheduled as a phone visit to comply with patient safety concerns in accordance with CDC recommendations. Total time of discussion was 15 minutes.    You have chosen to receive care through a telephone visit. Do you consent to use a telephone visit for your medical care today? 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  She is managing to keep her grades up but it is a real struggle.  We have discussed in the past that she has symptoms of adult ADD.  It is to the point now where she thinks she might need medication to help her get through the rest of her nursing school classes.    History of Present Illness    Patient has the symptoms of ADD every day.  Without the medication has difficulty completing tasks like to focus and very poor concentration.  With the medication patient is able to finish assignments and projects and stay on task.  Patient has had ADD symptoms for several years including in childhood.    The following portions of the patient's history were reviewed and updated as appropriate: allergies, current medications, past family history, past medical history, past social history, past surgical history and problem list.    Review of Systems   HENT: Negative.    Respiratory: Negative.  Negative for shortness of breath.    Cardiovascular: Negative.    Gastrointestinal: Negative.    Musculoskeletal: Positive for arthralgias. Negative for myalgias.   Skin: Negative.    Allergic/Immunologic: Negative for immunocompromised state.   Neurological: Negative for dizziness, tremors, seizures, syncope and weakness.   Hematological: Negative.    Psychiatric/Behavioral: Negative for agitation, confusion, dysphoric mood and sleep disturbance. The patient is not nervous/anxious.    All other systems reviewed and are  negative.      Objective   Physical Exam    Assessment/Plan   Diagnoses and all orders for this visit:    Attention deficit  -     lisdexamfetamine (Vyvanse) 20 MG capsule; Take 1 capsule by mouth Every Morning    The patient has read and signed the [facility] Controlled Substance Contract.  I will continue to see patient for regular follow up appointments. Patient is well controlled on the medication.  RICCARDO has been reviewed by me and is updated every 3 months. The patient is aware of the potential for addiction and dependence.   Will have her stop the phentermine and will switch to Vyvanse for adult ADD.  She will follow-up with me in 1 month or sooner for problems        This document has been electronically signed by Reyna Juan MD on June 8, 2020 12:42

## 2020-06-09 NOTE — PROGRESS NOTES
[facility]# [id number].

## 2020-06-30 ENCOUNTER — OFFICE VISIT (OUTPATIENT)
Dept: FAMILY MEDICINE CLINIC | Facility: CLINIC | Age: 40
End: 2020-06-30

## 2020-06-30 DIAGNOSIS — M79.671 CHRONIC HEEL PAIN, RIGHT: ICD-10-CM

## 2020-06-30 DIAGNOSIS — G89.29 CHRONIC HEEL PAIN, RIGHT: ICD-10-CM

## 2020-06-30 DIAGNOSIS — R41.840 ATTENTION DEFICIT: Primary | ICD-10-CM

## 2020-06-30 PROCEDURE — 99442 PR PHYS/QHP TELEPHONE EVALUATION 11-20 MIN: CPT | Performed by: FAMILY MEDICINE

## 2020-06-30 RX ORDER — ONDANSETRON 4 MG/1
4 TABLET, FILM COATED ORAL EVERY 6 HOURS PRN
Qty: 30 TABLET | Refills: 1 | Status: SHIPPED | OUTPATIENT
Start: 2020-06-30 | End: 2021-03-01 | Stop reason: SDUPTHER

## 2020-06-30 RX ORDER — DEXTROAMPHETAMINE SACCHARATE, AMPHETAMINE ASPARTATE, DEXTROAMPHETAMINE SULFATE AND AMPHETAMINE SULFATE 2.5; 2.5; 2.5; 2.5 MG/1; MG/1; MG/1; MG/1
10 TABLET ORAL DAILY
Qty: 30 TABLET | Refills: 0 | Status: SHIPPED | OUTPATIENT
Start: 2020-06-30 | End: 2020-08-28 | Stop reason: SDUPTHER

## 2020-06-30 NOTE — PROGRESS NOTES
Subjective   Isis'Clotilde Powell is a 40 y.o. female.   This visit has been rescheduled as a phone visit to comply with patient safety concerns in accordance with CDC recommendations. Total time of discussion was 15 minutes.    You have chosen to receive care through a telephone visit. Do you consent to use a telephone visit for your medical care today? Yes    She is following up today on attention deficit issues.  We suspected adult ADD.  She works full-time and is going back to school and was having considerable difficulty concentrating focusing and completing tasks.  The Adderall helped tremendously but it just does not seem to last long enough.  She works a full day and then has to go home and study and it wears out by that time.  Also she has been having pain in the right inner heel, questions whether she may have a bone spur.   The pain started a few months ago after she did a lot of walking wearing flat shoes and has persisted.  It is worse when she wears flats and when she has been sitting for quite a while.    History of Present Illness    The following portions of the patient's history were reviewed and updated as appropriate: allergies, current medications, past family history, past medical history, past social history, past surgical history and problem list.    Review of Systems   HENT: Negative.    Respiratory: Negative.  Negative for shortness of breath.    Cardiovascular: Negative.    Gastrointestinal: Negative.    Musculoskeletal: Positive for arthralgias. Negative for myalgias.   Skin: Negative.    Allergic/Immunologic: Negative for immunocompromised state.   Neurological: Negative for dizziness, tremors, seizures, syncope and weakness.   Hematological: Negative.    Psychiatric/Behavioral: Negative for agitation, confusion, dysphoric mood and sleep disturbance. The patient is not nervous/anxious.    All other systems reviewed and are negative.      Objective   Physical Exam    Assessment/Plan    Charlie was seen today for follow-up.    Diagnoses and all orders for this visit:    Attention deficit  -     lisdexamfetamine (Vyvanse) 30 MG capsule; Take 1 capsule by mouth Every Morning  -     amphetamine-dextroamphetamine (Adderall) 10 MG tablet; Take 1 tablet by mouth Daily.    Chronic heel pain, right  -     XR Foot 3+ View Right; Future    Other orders  -     ondansetron (ZOFRAN) 4 MG tablet; Take 1 tablet by mouth Every 6 (Six) Hours As Needed for Nausea or Vomiting.    The patient has read and signed the [facility] Controlled Substance Contract.  I will continue to see patient for regular follow up appointments. Patient is well controlled on the medication.  RICCARDO has been reviewed by me and is updated every 3 months. The patient is aware of the potential for addiction and dependence.        We will increase the dose of the Vyvanse to 30 mg and add a short acting Adderall for the longer days when she needs to study in the afternoons.  Cautioned about taking it too close to bedtime.    Recommended an arch support and we will x-ray the foot, consider podiatry referral.  Will recommend stretches for the foot as well.        This document has been electronically signed by Reyna Juan MD on June 30, 2020 11:50

## 2020-07-14 DIAGNOSIS — M77.30 CALCANEAL SPUR, UNSPECIFIED LATERALITY: Primary | ICD-10-CM

## 2020-08-04 DIAGNOSIS — R41.840 ATTENTION DEFICIT: ICD-10-CM

## 2020-08-28 DIAGNOSIS — R41.840 ATTENTION DEFICIT: ICD-10-CM

## 2020-08-28 RX ORDER — DEXTROAMPHETAMINE SACCHARATE, AMPHETAMINE ASPARTATE, DEXTROAMPHETAMINE SULFATE AND AMPHETAMINE SULFATE 2.5; 2.5; 2.5; 2.5 MG/1; MG/1; MG/1; MG/1
10 TABLET ORAL DAILY
Qty: 30 TABLET | Refills: 0 | Status: SHIPPED | OUTPATIENT
Start: 2020-08-28 | End: 2020-11-06 | Stop reason: SDUPTHER

## 2020-10-15 ENCOUNTER — TELEMEDICINE (OUTPATIENT)
Dept: FAMILY MEDICINE CLINIC | Facility: CLINIC | Age: 40
End: 2020-10-15

## 2020-10-15 DIAGNOSIS — J06.9 UPPER RESPIRATORY TRACT INFECTION DUE TO COVID-19 VIRUS: Primary | ICD-10-CM

## 2020-10-15 DIAGNOSIS — U07.1 UPPER RESPIRATORY TRACT INFECTION DUE TO COVID-19 VIRUS: Primary | ICD-10-CM

## 2020-10-15 PROCEDURE — 99213 OFFICE O/P EST LOW 20 MIN: CPT | Performed by: FAMILY MEDICINE

## 2020-10-15 RX ORDER — PROMETHAZINE HYDROCHLORIDE AND CODEINE PHOSPHATE 6.25; 1 MG/5ML; MG/5ML
5 SYRUP ORAL EVERY 4 HOURS PRN
Qty: 240 ML | Refills: 0 | Status: SHIPPED | OUTPATIENT
Start: 2020-10-15 | End: 2020-11-06 | Stop reason: SDUPTHER

## 2020-10-15 NOTE — PROGRESS NOTES
Subjective   Charlie Powell is a 40 y.o. female.   Seen today by video visit due to the Covid-19 quarantine.   Patient was exposed presumably through a coworker to COVID-19.  Within a few days she began to have symptoms including cough shortness of breath with exertion and chest congestion and fatigue.  She has been carefully quarantining herself at home and is starting to feel a bit better over the past couple of days but would like something for the cough to help her rest at night.    History of Present Illness    The following portions of the patient's history were reviewed and updated as appropriate: allergies, current medications, past family history, past medical history, past social history, past surgical history and problem list.    Review of Systems   HENT: Negative.    Respiratory: Negative.  Negative for shortness of breath.    Cardiovascular: Negative.    Gastrointestinal: Negative.    Musculoskeletal: Positive for arthralgias. Negative for myalgias.   Skin: Negative.    Allergic/Immunologic: Negative for immunocompromised state.   Neurological: Negative for dizziness, tremors, seizures, syncope and weakness.   Hematological: Negative.    Psychiatric/Behavioral: Negative for agitation, confusion, dysphoric mood and sleep disturbance. The patient is not nervous/anxious.    All other systems reviewed and are negative.      Objective   Physical Exam  Constitutional:       General: She is not in acute distress.     Appearance: She is well-developed.   HENT:      Head: Normocephalic and atraumatic.   Eyes:      General:         Right eye: No discharge.         Left eye: No discharge.      Pupils: Pupils are equal, round, and reactive to light.   Pulmonary:      Effort: Pulmonary effort is normal.      Comments: Patient coughing several times during the visit today  Neurological:      Mental Status: She is alert and oriented to person, place, and time.   Psychiatric:         Behavior: Behavior  normal.         Thought Content: Thought content normal.         Judgment: Judgment normal.         Assessment/Plan   Diagnoses and all orders for this visit:    1. Upper respiratory tract infection due to COVID-19 virus (Primary)  -     promethazine-codeine (PHENERGAN with CODEINE) 6.25-10 MG/5ML syrup; Take 5 mL by mouth Every 4 (Four) Hours As Needed for Cough.  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  Will give some cough syrup to help her rest at night and she is advised to contact me for any change in status at all.          This document has been electronically signed by Reyna Juan MD on October 15, 2020 09:09 CDT

## 2020-11-02 DIAGNOSIS — R05.9 COUGH: Primary | ICD-10-CM

## 2020-11-06 ENCOUNTER — OFFICE VISIT (OUTPATIENT)
Dept: FAMILY MEDICINE CLINIC | Facility: CLINIC | Age: 40
End: 2020-11-06

## 2020-11-06 VITALS — HEIGHT: 64 IN | BODY MASS INDEX: 28.17 KG/M2 | WEIGHT: 165 LBS

## 2020-11-06 DIAGNOSIS — J06.9 UPPER RESPIRATORY TRACT INFECTION DUE TO COVID-19 VIRUS: ICD-10-CM

## 2020-11-06 DIAGNOSIS — R41.840 ATTENTION DEFICIT: ICD-10-CM

## 2020-11-06 DIAGNOSIS — U07.1 UPPER RESPIRATORY TRACT INFECTION DUE TO COVID-19 VIRUS: ICD-10-CM

## 2020-11-06 PROCEDURE — 99442 PR PHYS/QHP TELEPHONE EVALUATION 11-20 MIN: CPT | Performed by: FAMILY MEDICINE

## 2020-11-06 RX ORDER — PROMETHAZINE HYDROCHLORIDE AND CODEINE PHOSPHATE 6.25; 1 MG/5ML; MG/5ML
5 SYRUP ORAL EVERY 4 HOURS PRN
Qty: 240 ML | Refills: 0 | Status: SHIPPED | OUTPATIENT
Start: 2020-11-06 | End: 2020-12-17 | Stop reason: SDUPTHER

## 2020-11-06 RX ORDER — DEXTROAMPHETAMINE SACCHARATE, AMPHETAMINE ASPARTATE, DEXTROAMPHETAMINE SULFATE AND AMPHETAMINE SULFATE 2.5; 2.5; 2.5; 2.5 MG/1; MG/1; MG/1; MG/1
10 TABLET ORAL DAILY
Qty: 30 TABLET | Refills: 0 | Status: SHIPPED | OUTPATIENT
Start: 2020-11-06 | End: 2021-01-12 | Stop reason: SDUPTHER

## 2020-11-06 NOTE — PROGRESS NOTES
Subjective   Isis'Clotilde Powell is a 40 y.o. female.   This visit has been rescheduled as a phone visit to comply with patient safety concerns in accordance with CDC recommendations. Total time of discussion was 15 minutes.    You have chosen to receive care through a telephone visit. Do you consent to use a telephone visit for your medical care today? Yes  Patient with recent Covid-19 infection, getting better.  She still gets violent coughing fits randomly.  She has been released by employee health but was told to try to work from home due to the continued cough. She still gets short of breath and fatigued easily.    She needs refills of Vyvanse and Adderall for adult ADD.    History of Present Illness    Patient has the symptoms of ADD every day.  Without the medication has difficulty completing tasks like to focus and very poor concentration.  With the medication patient is able to finish assignments and projects and stay on task.  Patient has had ADD symptoms for several years including in childhood.  The following portions of the patient's history were reviewed and updated as appropriate: allergies, current medications, past family history, past medical history, past social history, past surgical history and problem list.    Review of Systems   HENT: Negative.    Respiratory: Negative.  Negative for shortness of breath.    Cardiovascular: Negative.    Gastrointestinal: Negative.    Musculoskeletal: Positive for arthralgias. Negative for myalgias.   Skin: Negative.    Allergic/Immunologic: Negative for immunocompromised state.   Neurological: Negative for dizziness, tremors, seizures, syncope and weakness.   Hematological: Negative.    Psychiatric/Behavioral: Negative for agitation, confusion, dysphoric mood and sleep disturbance. The patient is not nervous/anxious.    All other systems reviewed and are negative.      Objective     Assessment/Plan   Diagnoses and all orders for this visit:    1.  Attention deficit  -     lisdexamfetamine (Vyvanse) 30 MG capsule; Take 1 capsule by mouth Every Morning  Dispense: 30 capsule; Refill: 0  -     amphetamine-dextroamphetamine (Adderall) 10 MG tablet; Take 1 tablet by mouth Daily.  Dispense: 30 tablet; Refill: 0    2. Upper respiratory tract infection due to COVID-19 virus  -     promethazine-codeine (PHENERGAN with CODEINE) 6.25-10 MG/5ML syrup; Take 5 mL by mouth Every 4 (Four) Hours As Needed for Cough.  Dispense: 240 mL; Refill: 0    The patient has read and signed the [facility] Controlled Substance Contract.  I will continue to see patient for regular follow up appointments. Patient is well controlled on the medication.  RICCARDO has been reviewed by me and is updated every 3 months. The patient is aware of the potential for addiction and dependence.     Will refill the cough syrup for her continued cough symptoms related to Covid-19 infection. Contact me for any recurent symptoms.           This document has been electronically signed by Reyna Juan MD on November 6, 2020 11:43 CST

## 2020-11-16 ENCOUNTER — LAB (OUTPATIENT)
Dept: LAB | Facility: OTHER | Age: 40
End: 2020-11-16

## 2020-11-16 DIAGNOSIS — E66.3 OVERWEIGHT: ICD-10-CM

## 2020-11-16 LAB
25(OH)D3 SERPL-MCNC: 42.8 NG/ML (ref 30–100)
ALBUMIN SERPL-MCNC: 4 G/DL (ref 3.5–5)
ALBUMIN/GLOB SERPL: 1 G/DL (ref 1.1–1.8)
ALP SERPL-CCNC: 88 U/L (ref 38–126)
ALT SERPL W P-5'-P-CCNC: 12 U/L
ANION GAP SERPL CALCULATED.3IONS-SCNC: 9 MMOL/L (ref 5–15)
AST SERPL-CCNC: 20 U/L (ref 14–36)
BASOPHILS # BLD AUTO: 0.06 10*3/MM3 (ref 0–0.2)
BASOPHILS NFR BLD AUTO: 0.6 % (ref 0–1.5)
BILIRUB SERPL-MCNC: 0.3 MG/DL (ref 0.2–1.3)
BUN SERPL-MCNC: 10 MG/DL (ref 7–23)
BUN/CREAT SERPL: 10.8 (ref 7–25)
CALCIUM SPEC-SCNC: 9.6 MG/DL (ref 8.4–10.2)
CHLORIDE SERPL-SCNC: 107 MMOL/L (ref 101–112)
CHOLEST SERPL-MCNC: 216 MG/DL (ref 150–200)
CO2 SERPL-SCNC: 23 MMOL/L (ref 22–30)
CREAT SERPL-MCNC: 0.93 MG/DL (ref 0.52–1.04)
DEPRECATED RDW RBC AUTO: 40.4 FL (ref 37–54)
EOSINOPHIL # BLD AUTO: 0.3 10*3/MM3 (ref 0–0.4)
EOSINOPHIL NFR BLD AUTO: 3.1 % (ref 0.3–6.2)
ERYTHROCYTE [DISTWIDTH] IN BLOOD BY AUTOMATED COUNT: 13.2 % (ref 12.3–15.4)
FOLATE SERPL-MCNC: 8.52 NG/ML (ref 4.78–24.2)
GFR SERPL CREATININE-BSD FRML MDRD: 66 ML/MIN/1.73 (ref 58–135)
GLOBULIN UR ELPH-MCNC: 4 GM/DL (ref 2.3–3.5)
GLUCOSE SERPL-MCNC: 99 MG/DL (ref 70–99)
HCT VFR BLD AUTO: 40.2 % (ref 34–46.6)
HDLC SERPL-MCNC: 66 MG/DL (ref 40–59)
HGB BLD-MCNC: 13 G/DL (ref 12–15.9)
LDLC SERPL CALC-MCNC: 113 MG/DL
LDLC/HDLC SERPL: 1.61 {RATIO} (ref 0–3.22)
LYMPHOCYTES # BLD AUTO: 2.72 10*3/MM3 (ref 0.7–3.1)
LYMPHOCYTES NFR BLD AUTO: 28.2 % (ref 19.6–45.3)
MCH RBC QN AUTO: 27.4 PG (ref 26.6–33)
MCHC RBC AUTO-ENTMCNC: 32.3 G/DL (ref 31.5–35.7)
MCV RBC AUTO: 84.6 FL (ref 79–97)
MONOCYTES # BLD AUTO: 0.86 10*3/MM3 (ref 0.1–0.9)
MONOCYTES NFR BLD AUTO: 8.9 % (ref 5–12)
NEUTROPHILS NFR BLD AUTO: 5.72 10*3/MM3 (ref 1.7–7)
NEUTROPHILS NFR BLD AUTO: 59.2 % (ref 42.7–76)
PLATELET # BLD AUTO: 405 10*3/MM3 (ref 140–450)
PMV BLD AUTO: 8.7 FL (ref 6–12)
POTASSIUM SERPL-SCNC: 4.2 MMOL/L (ref 3.4–5)
PROT SERPL-MCNC: 8 G/DL (ref 6.3–8.6)
RBC # BLD AUTO: 4.75 10*6/MM3 (ref 3.77–5.28)
SODIUM SERPL-SCNC: 139 MMOL/L (ref 137–145)
T4 FREE SERPL-MCNC: 1.23 NG/DL (ref 0.93–1.7)
TRIGL SERPL-MCNC: 218 MG/DL
TSH SERPL DL<=0.05 MIU/L-ACNC: 4.67 UIU/ML (ref 0.27–4.2)
VIT B12 BLD-MCNC: 316 PG/ML (ref 211–946)
VLDLC SERPL-MCNC: 37 MG/DL (ref 5–40)
WBC # BLD AUTO: 9.66 10*3/MM3 (ref 3.4–10.8)

## 2020-11-16 PROCEDURE — 36415 COLL VENOUS BLD VENIPUNCTURE: CPT | Performed by: FAMILY MEDICINE

## 2020-11-16 PROCEDURE — 80053 COMPREHEN METABOLIC PANEL: CPT | Performed by: FAMILY MEDICINE

## 2020-11-16 PROCEDURE — 82607 VITAMIN B-12: CPT | Performed by: FAMILY MEDICINE

## 2020-11-16 PROCEDURE — 84443 ASSAY THYROID STIM HORMONE: CPT | Performed by: FAMILY MEDICINE

## 2020-11-16 PROCEDURE — 82306 VITAMIN D 25 HYDROXY: CPT | Performed by: FAMILY MEDICINE

## 2020-11-16 PROCEDURE — 82746 ASSAY OF FOLIC ACID SERUM: CPT | Performed by: FAMILY MEDICINE

## 2020-11-16 PROCEDURE — 85025 COMPLETE CBC W/AUTO DIFF WBC: CPT | Performed by: FAMILY MEDICINE

## 2020-11-16 PROCEDURE — 84439 ASSAY OF FREE THYROXINE: CPT | Performed by: FAMILY MEDICINE

## 2020-11-16 PROCEDURE — 80061 LIPID PANEL: CPT | Performed by: FAMILY MEDICINE

## 2020-11-17 RX ORDER — LEVOTHYROXINE SODIUM 0.03 MG/1
25 TABLET ORAL DAILY
Qty: 30 TABLET | Refills: 5 | Status: SHIPPED | OUTPATIENT
Start: 2020-11-17 | End: 2021-07-06

## 2020-12-08 ENCOUNTER — OFFICE VISIT (OUTPATIENT)
Dept: FAMILY MEDICINE CLINIC | Facility: CLINIC | Age: 40
End: 2020-12-08

## 2020-12-08 DIAGNOSIS — Z20.822 COUGH WITH EXPOSURE TO COVID-19 VIRUS: ICD-10-CM

## 2020-12-08 DIAGNOSIS — J06.9 UPPER RESPIRATORY TRACT INFECTION DUE TO COVID-19 VIRUS: Primary | ICD-10-CM

## 2020-12-08 DIAGNOSIS — R05.8 COUGH WITH EXPOSURE TO COVID-19 VIRUS: ICD-10-CM

## 2020-12-08 DIAGNOSIS — F43.21 ADJUSTMENT DISORDER WITH DEPRESSED MOOD: ICD-10-CM

## 2020-12-08 DIAGNOSIS — U07.1 UPPER RESPIRATORY TRACT INFECTION DUE TO COVID-19 VIRUS: Primary | ICD-10-CM

## 2020-12-08 PROCEDURE — 99213 OFFICE O/P EST LOW 20 MIN: CPT | Performed by: FAMILY MEDICINE

## 2020-12-08 RX ORDER — BUPROPION HYDROCHLORIDE 150 MG/1
150 TABLET ORAL DAILY
Qty: 30 TABLET | Refills: 5 | Status: SHIPPED | OUTPATIENT
Start: 2020-12-08 | End: 2021-10-15

## 2020-12-08 NOTE — PROGRESS NOTES
Subjective   Charlie Powell is a 40 y.o. female.   Patient recovering from a significant Covid infection over the past couple of months.  She still has a frequent, non-productive cough and a lot of fatigue.  She has been very emotional over the past few day.  The illness and subsequent symptoms as well as the isolation have taken a significant toll on her emotionally.  She cries easily and is feeling very sad.     History of Present Illness    The following portions of the patient's history were reviewed and updated as appropriate: allergies, current medications, past family history, past medical history, past social history, past surgical history and problem list.    Review of Systems   Constitutional: Positive for activity change and fatigue.   Respiratory: Positive for cough and shortness of breath.    Cardiovascular: Negative.    Gastrointestinal: Negative.    Musculoskeletal: Positive for arthralgias. Negative for myalgias.   Skin: Negative.    Allergic/Immunologic: Negative for immunocompromised state.   Neurological: Negative for dizziness, tremors, seizures, syncope and weakness.   Hematological: Negative.    Psychiatric/Behavioral: Positive for dysphoric mood. Negative for agitation, confusion and sleep disturbance. The patient is not nervous/anxious.    All other systems reviewed and are negative.      Objective   Physical Exam  Constitutional:       General: She is not in acute distress.     Appearance: She is well-developed.   HENT:      Head: Normocephalic and atraumatic.   Eyes:      General:         Right eye: No discharge.         Left eye: No discharge.      Pupils: Pupils are equal, round, and reactive to light.   Pulmonary:      Effort: Pulmonary effort is normal.   Neurological:      Mental Status: She is alert and oriented to person, place, and time.   Psychiatric:         Behavior: Behavior normal.         Thought Content: Thought content normal.         Judgment: Judgment normal.             Assessment/Plan   Diagnoses and all orders for this visit:    1. Upper respiratory tract infection due to COVID-19 virus (Primary)    2. Cough with exposure to COVID-19 virus    3. Adjustment disorder with depressed mood  -     buPROPion XL (Wellbutrin XL) 150 MG 24 hr tablet; Take 1 tablet by mouth Daily.  Dispense: 30 tablet; Refill: 5     Had discussion with patient that she has been through a very serious health threat and is still going to be recovering for quite some time.  It is not unusual to feel emotional in such circumstances and compounded with the isolation she has had from the Covid she is very depressed.  Recommend to start Wellbutrin at least in the short term.  She is in agreement. Will follow up with me on this in a month or sooner for problems or worsening symptoms.    Continues to have chronic cough.  She has some cough syrup at home to help her rest at night.  From experience with other Covid patients expect the cough will probably last for several weeks.  She does feel that she is improving very slowly but if she feels that she takes a turn in the other direction contact me for further evaluation.          This document has been electronically signed by Reyna Juan MD on December 8, 2020 13:06 CST

## 2020-12-17 DIAGNOSIS — J06.9 UPPER RESPIRATORY TRACT INFECTION DUE TO COVID-19 VIRUS: ICD-10-CM

## 2020-12-17 DIAGNOSIS — U07.1 UPPER RESPIRATORY TRACT INFECTION DUE TO COVID-19 VIRUS: ICD-10-CM

## 2020-12-17 RX ORDER — PROMETHAZINE HYDROCHLORIDE AND CODEINE PHOSPHATE 6.25; 1 MG/5ML; MG/5ML
5 SYRUP ORAL EVERY 4 HOURS PRN
Qty: 240 ML | Refills: 0 | Status: SHIPPED | OUTPATIENT
Start: 2020-12-17 | End: 2021-11-15

## 2020-12-29 ENCOUNTER — IMMUNIZATION (OUTPATIENT)
Dept: VACCINE CLINIC | Facility: HOSPITAL | Age: 40
End: 2020-12-29

## 2020-12-29 ENCOUNTER — APPOINTMENT (OUTPATIENT)
Dept: VACCINE CLINIC | Facility: HOSPITAL | Age: 40
End: 2020-12-29

## 2020-12-29 PROCEDURE — 91300 HC SARSCOV02 VAC 30MCG/0.3ML IM: CPT | Performed by: THORACIC SURGERY (CARDIOTHORACIC VASCULAR SURGERY)

## 2020-12-29 PROCEDURE — 0001A: CPT | Performed by: THORACIC SURGERY (CARDIOTHORACIC VASCULAR SURGERY)

## 2021-01-12 DIAGNOSIS — R41.840 ATTENTION DEFICIT: ICD-10-CM

## 2021-01-12 RX ORDER — DEXTROAMPHETAMINE SACCHARATE, AMPHETAMINE ASPARTATE, DEXTROAMPHETAMINE SULFATE AND AMPHETAMINE SULFATE 2.5; 2.5; 2.5; 2.5 MG/1; MG/1; MG/1; MG/1
10 TABLET ORAL DAILY
Qty: 30 TABLET | Refills: 0 | Status: SHIPPED | OUTPATIENT
Start: 2021-01-12 | End: 2021-03-11 | Stop reason: SDUPTHER

## 2021-01-19 ENCOUNTER — IMMUNIZATION (OUTPATIENT)
Dept: VACCINE CLINIC | Facility: HOSPITAL | Age: 41
End: 2021-01-19

## 2021-01-19 PROCEDURE — 0002A: CPT | Performed by: THORACIC SURGERY (CARDIOTHORACIC VASCULAR SURGERY)

## 2021-01-19 PROCEDURE — 91300 HC SARSCOV02 VAC 30MCG/0.3ML IM: CPT | Performed by: THORACIC SURGERY (CARDIOTHORACIC VASCULAR SURGERY)

## 2021-02-09 ENCOUNTER — LAB (OUTPATIENT)
Dept: LAB | Facility: OTHER | Age: 41
End: 2021-02-09

## 2021-02-09 DIAGNOSIS — Z01.84 IMMUNITY STATUS TESTING: ICD-10-CM

## 2021-02-09 DIAGNOSIS — I10 BENIGN ESSENTIAL HTN: Primary | ICD-10-CM

## 2021-02-09 DIAGNOSIS — I10 BENIGN ESSENTIAL HTN: ICD-10-CM

## 2021-02-09 PROCEDURE — 86787 VARICELLA-ZOSTER ANTIBODY: CPT | Performed by: FAMILY MEDICINE

## 2021-02-09 PROCEDURE — 86735 MUMPS ANTIBODY: CPT | Performed by: FAMILY MEDICINE

## 2021-02-09 PROCEDURE — 84439 ASSAY OF FREE THYROXINE: CPT | Performed by: FAMILY MEDICINE

## 2021-02-09 PROCEDURE — 86765 RUBEOLA ANTIBODY: CPT | Performed by: FAMILY MEDICINE

## 2021-02-09 PROCEDURE — 36415 COLL VENOUS BLD VENIPUNCTURE: CPT | Performed by: FAMILY MEDICINE

## 2021-02-09 PROCEDURE — 86762 RUBELLA ANTIBODY: CPT | Performed by: FAMILY MEDICINE

## 2021-02-09 PROCEDURE — 84443 ASSAY THYROID STIM HORMONE: CPT | Performed by: FAMILY MEDICINE

## 2021-02-10 LAB
T4 FREE SERPL-MCNC: 1.27 NG/DL (ref 0.93–1.7)
TSH SERPL DL<=0.05 MIU/L-ACNC: 1.74 UIU/ML (ref 0.27–4.2)
VZV IGG SER IA-ACNC: POSITIVE

## 2021-02-11 LAB
MEV IGG SER IA-ACNC: >300 AU/ML
MUV IGG SER IA-ACNC: 73.4 AU/ML
RUBV IGG SERPL IA-ACNC: 0.93 INDEX

## 2021-02-15 NOTE — PROGRESS NOTES
Not showing immunity to Rubella.  I will look into whether she can get another MMR or if there is individual vaccine for Rubella.

## 2021-03-01 RX ORDER — ONDANSETRON 4 MG/1
4 TABLET, FILM COATED ORAL EVERY 6 HOURS PRN
Qty: 30 TABLET | Refills: 1 | Status: SHIPPED | OUTPATIENT
Start: 2021-03-01 | End: 2022-07-26 | Stop reason: SDUPTHER

## 2021-03-01 RX ORDER — IBUPROFEN 800 MG/1
800 TABLET ORAL 3 TIMES DAILY PRN
Qty: 90 TABLET | Refills: 5 | Status: SHIPPED | OUTPATIENT
Start: 2021-03-01 | End: 2022-03-31

## 2021-03-08 ENCOUNTER — CLINICAL SUPPORT (OUTPATIENT)
Dept: FAMILY MEDICINE CLINIC | Facility: CLINIC | Age: 41
End: 2021-03-08

## 2021-03-08 DIAGNOSIS — Z23 NEED FOR VACCINATION: Primary | ICD-10-CM

## 2021-03-08 PROCEDURE — 90707 MMR VACCINE SC: CPT | Performed by: FAMILY MEDICINE

## 2021-03-08 PROCEDURE — 90471 IMMUNIZATION ADMIN: CPT | Performed by: FAMILY MEDICINE

## 2021-03-11 ENCOUNTER — OFFICE VISIT (OUTPATIENT)
Dept: FAMILY MEDICINE CLINIC | Facility: CLINIC | Age: 41
End: 2021-03-11

## 2021-03-11 VITALS — HEIGHT: 55 IN | BODY MASS INDEX: 42.58 KG/M2 | WEIGHT: 184 LBS

## 2021-03-11 DIAGNOSIS — Z11.1 SCREENING-PULMONARY TB: ICD-10-CM

## 2021-03-11 DIAGNOSIS — R55 SYNCOPE AND COLLAPSE: ICD-10-CM

## 2021-03-11 DIAGNOSIS — R41.840 ATTENTION DEFICIT: Primary | ICD-10-CM

## 2021-03-11 DIAGNOSIS — S69.91XD INJURY OF RIGHT WRIST, SUBSEQUENT ENCOUNTER: ICD-10-CM

## 2021-03-11 PROCEDURE — 86580 TB INTRADERMAL TEST: CPT | Performed by: FAMILY MEDICINE

## 2021-03-11 PROCEDURE — 99214 OFFICE O/P EST MOD 30 MIN: CPT | Performed by: FAMILY MEDICINE

## 2021-03-11 RX ORDER — DEXTROAMPHETAMINE SACCHARATE, AMPHETAMINE ASPARTATE, DEXTROAMPHETAMINE SULFATE AND AMPHETAMINE SULFATE 2.5; 2.5; 2.5; 2.5 MG/1; MG/1; MG/1; MG/1
10 TABLET ORAL DAILY
Qty: 30 TABLET | Refills: 0 | Status: SHIPPED | OUTPATIENT
Start: 2021-03-11 | End: 2021-07-06 | Stop reason: SDUPTHER
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
## 2021-03-15 LAB
INDURATION: 0 MM (ref 0–10)
Lab: NORMAL
Lab: NORMAL
TB SKIN TEST: NEGATIVE

## 2021-03-23 ENCOUNTER — CLINICAL SUPPORT (OUTPATIENT)
Dept: FAMILY MEDICINE CLINIC | Facility: CLINIC | Age: 41
End: 2021-03-23

## 2021-03-23 DIAGNOSIS — Z11.1 SCREENING-PULMONARY TB: Primary | ICD-10-CM

## 2021-03-23 PROCEDURE — 86580 TB INTRADERMAL TEST: CPT | Performed by: FAMILY MEDICINE

## 2021-03-29 LAB
INDURATION: 0 MM (ref 0–10)
Lab: NORMAL
Lab: NORMAL
TB SKIN TEST: NEGATIVE

## 2021-04-01 DIAGNOSIS — Z01.84 IMMUNITY STATUS TESTING: Primary | ICD-10-CM

## 2021-04-05 ENCOUNTER — LAB (OUTPATIENT)
Dept: LAB | Facility: OTHER | Age: 41
End: 2021-04-05

## 2021-04-05 DIAGNOSIS — Z01.84 IMMUNITY STATUS TESTING: ICD-10-CM

## 2021-04-05 PROCEDURE — 86762 RUBELLA ANTIBODY: CPT | Performed by: FAMILY MEDICINE

## 2021-04-05 PROCEDURE — 36415 COLL VENOUS BLD VENIPUNCTURE: CPT | Performed by: FAMILY MEDICINE

## 2021-04-06 LAB — RUBV IGG SERPL IA-ACNC: POSITIVE

## 2021-06-17 ENCOUNTER — OFFICE VISIT (OUTPATIENT)
Dept: FAMILY MEDICINE CLINIC | Facility: CLINIC | Age: 41
End: 2021-06-17

## 2021-06-17 VITALS — HEIGHT: 64 IN | WEIGHT: 179 LBS | BODY MASS INDEX: 30.56 KG/M2

## 2021-06-17 DIAGNOSIS — E03.9 HYPOTHYROIDISM, UNSPECIFIED TYPE: Primary | ICD-10-CM

## 2021-06-17 DIAGNOSIS — M54.41 ACUTE BILATERAL LOW BACK PAIN WITH RIGHT-SIDED SCIATICA: Primary | ICD-10-CM

## 2021-06-17 PROCEDURE — 96372 THER/PROPH/DIAG INJ SC/IM: CPT | Performed by: FAMILY MEDICINE

## 2021-06-17 PROCEDURE — 99214 OFFICE O/P EST MOD 30 MIN: CPT | Performed by: FAMILY MEDICINE

## 2021-06-17 RX ORDER — METHYLPREDNISOLONE ACETATE 80 MG/ML
80 INJECTION, SUSPENSION INTRA-ARTICULAR; INTRALESIONAL; INTRAMUSCULAR; SOFT TISSUE ONCE
Status: COMPLETED | OUTPATIENT
Start: 2021-06-17 | End: 2021-06-17

## 2021-06-17 RX ADMIN — METHYLPREDNISOLONE ACETATE 80 MG: 80 INJECTION, SUSPENSION INTRA-ARTICULAR; INTRALESIONAL; INTRAMUSCULAR; SOFT TISSUE at 14:45

## 2021-06-17 NOTE — PROGRESS NOTES
"  Subjective   Isis'Clotilde Powell is a 41 y.o. female here today for acute onset of back pain approximately 4 days ago.  She had done some lifting and then noticed the pain that shot from her low back across through the right buttock and all the way down the right leg to the foot with some tingling down the right leg.  Has not really had back issues before.    History of Present Illness      Patient has the symptoms of ADD every day.  Without the medication has difficulty completing tasks like to focus and very poor concentration.  With the medication patient is able to finish assignments and projects and stay on task.  Patient has had ADD symptoms for several years including in childhood.      Review of Systems   HENT: Negative.    Respiratory: Negative.  Negative for shortness of breath.    Cardiovascular: Negative.    Gastrointestinal: Negative.    Musculoskeletal: Positive for arthralgias. Negative for myalgias.   Skin: Negative.    Allergic/Immunologic: Negative for immunocompromised state.   Neurological: Negative for dizziness, tremors, seizures, syncope and weakness.   Hematological: Negative.    Psychiatric/Behavioral: Negative for agitation, confusion, dysphoric mood and sleep disturbance. The patient is not nervous/anxious.    All other systems reviewed and are negative.      Objective    Vitals:    06/17/21 1304   Weight: 81.2 kg (179 lb)   Height: 163.1 cm (64.2\")   PainSc:   5   PainLoc: Hip  Comment: right     Body mass index is 30.53 kg/m².    Physical Exam   Constitutional: She is oriented to person, place, and time. She appears well-developed.   HENT:   Head: Normocephalic and atraumatic.   Nose: Nose normal.   Eyes: Pupils are equal, round, and reactive to light. Conjunctivae are normal.   Neck: No JVD present. No tracheal deviation present. No thyromegaly present.   Cardiovascular: Normal rate, regular rhythm and normal heart sounds.   No murmur heard.  Pulmonary/Chest: Effort normal and " breath sounds normal. She has no wheezes.   Abdominal: Soft. Bowel sounds are normal. She exhibits no distension. There is no abdominal tenderness.   Musculoskeletal: Normal range of motion.      Comments: Wrist splint over R wrist   Lymphadenopathy:     She has no cervical adenopathy.   Neurological: She is alert and oriented to person, place, and time. She displays abnormal reflex. Coordination normal.   Mildly hyperreflexic in the right leg    Straight leg raising test positive with flexion of the right leg at the hip   Skin: Skin is warm and dry. No rash noted.       Nursing note and vitals reviewed.     Radiology Images   Study Result    Narrative & Impression   PROCEDURE: XR SPINE LUMBAR 2 OR 3 VW     VIEWS:  3     INDICATION:  Pain     COMPARISON:  None     FINDINGS:        - Fracture(s): None    - Alignment:  Within normal limits      - Disc space heights: Mildly narrowed at L5-S1.    - Focal osteolytic/blastic: None      - Bone density:  Grossly within normal limits for age    - Misc.:  Facets are normally aligned        IMPRESSION:  Mild narrowing of intervertebral disc space at  L5-S1..         If pain or symptoms persist beyond reasonable expectations,   an MRI examination is suggested as is deemed clinically  appropriate.        Electronically signed by:  Maureen Hansen MD  6/17/2021 1:54 PM CDT  Workstation: [id number]       Assessment/Plan   Diagnoses and all orders for this visit:    1. Acute bilateral low back pain with right-sided sciatica (Primary)  -     XR Spine Lumbar 2 or 3 View; Future  -     Cancel: MRI Lumbar Spine Without Contrast; Future  -     MRI Lumbar Spine Without Contrast; Future  -     methylPREDNISolone acetate (DEPO-medrol) injection 80 mg    Given acute findings and appearance of x-ray, will get MRI of the lumbar spine and make referral if indicated.  Gave Depo-Medrol milligrams IM today.          This document has been electronically signed by Reyna Juan MD on June 17,  2021 13:27 CDT

## 2021-07-06 DIAGNOSIS — R41.840 ATTENTION DEFICIT: ICD-10-CM

## 2021-07-06 RX ORDER — DEXTROAMPHETAMINE SACCHARATE, AMPHETAMINE ASPARTATE, DEXTROAMPHETAMINE SULFATE AND AMPHETAMINE SULFATE 2.5; 2.5; 2.5; 2.5 MG/1; MG/1; MG/1; MG/1
10 TABLET ORAL DAILY
Qty: 30 TABLET | Refills: 0 | Status: SHIPPED | OUTPATIENT
Start: 2021-07-06 | End: 2021-09-14 | Stop reason: SDUPTHER

## 2021-07-06 RX ORDER — LEVOTHYROXINE SODIUM 0.03 MG/1
25 TABLET ORAL DAILY
Qty: 30 TABLET | Refills: 11 | Status: SHIPPED | OUTPATIENT
Start: 2021-07-06 | End: 2022-07-18

## 2021-09-14 ENCOUNTER — LAB (OUTPATIENT)
Dept: LAB | Facility: OTHER | Age: 41
End: 2021-09-14

## 2021-09-14 DIAGNOSIS — R41.840 ATTENTION DEFICIT: ICD-10-CM

## 2021-09-14 DIAGNOSIS — E03.9 HYPOTHYROIDISM, UNSPECIFIED TYPE: ICD-10-CM

## 2021-09-14 LAB
T4 FREE SERPL-MCNC: 1.38 NG/DL (ref 0.93–1.7)
TSH SERPL DL<=0.05 MIU/L-ACNC: 1.24 UIU/ML (ref 0.27–4.2)

## 2021-09-14 PROCEDURE — 84443 ASSAY THYROID STIM HORMONE: CPT | Performed by: FAMILY MEDICINE

## 2021-09-14 PROCEDURE — 36415 COLL VENOUS BLD VENIPUNCTURE: CPT | Performed by: FAMILY MEDICINE

## 2021-09-14 PROCEDURE — 84439 ASSAY OF FREE THYROXINE: CPT | Performed by: FAMILY MEDICINE

## 2021-09-14 RX ORDER — DEXTROAMPHETAMINE SACCHARATE, AMPHETAMINE ASPARTATE, DEXTROAMPHETAMINE SULFATE AND AMPHETAMINE SULFATE 2.5; 2.5; 2.5; 2.5 MG/1; MG/1; MG/1; MG/1
10 TABLET ORAL DAILY
Qty: 30 TABLET | Refills: 0 | Status: SHIPPED | OUTPATIENT
Start: 2021-09-14 | End: 2022-07-26 | Stop reason: SDUPTHER

## 2021-10-15 DIAGNOSIS — F43.21 ADJUSTMENT DISORDER WITH DEPRESSED MOOD: ICD-10-CM

## 2021-10-15 RX ORDER — BUPROPION HYDROCHLORIDE 150 MG/1
150 TABLET ORAL DAILY
Qty: 30 TABLET | Refills: 5 | Status: SHIPPED | OUTPATIENT
Start: 2021-10-15 | End: 2022-03-31

## 2021-10-26 RX ORDER — SULFAMETHOXAZOLE AND TRIMETHOPRIM 800; 160 MG/1; MG/1
1 TABLET ORAL 2 TIMES DAILY
Qty: 20 TABLET | Refills: 0 | Status: SHIPPED | OUTPATIENT
Start: 2021-10-26 | End: 2022-03-31

## 2021-10-26 RX ORDER — PHENAZOPYRIDINE HYDROCHLORIDE 200 MG/1
200 TABLET, FILM COATED ORAL 3 TIMES DAILY PRN
Qty: 15 TABLET | Refills: 0 | Status: SHIPPED | OUTPATIENT
Start: 2021-10-26 | End: 2022-03-31

## 2021-10-26 RX ORDER — FLUCONAZOLE 150 MG/1
150 TABLET ORAL ONCE
Qty: 1 TABLET | Refills: 2 | Status: SHIPPED | OUTPATIENT
Start: 2021-10-26 | End: 2021-10-26

## 2021-11-15 ENCOUNTER — OFFICE VISIT (OUTPATIENT)
Dept: FAMILY MEDICINE CLINIC | Facility: CLINIC | Age: 41
End: 2021-11-15

## 2021-11-15 VITALS
DIASTOLIC BLOOD PRESSURE: 90 MMHG | OXYGEN SATURATION: 97 % | HEART RATE: 103 BPM | HEIGHT: 64 IN | BODY MASS INDEX: 28.85 KG/M2 | SYSTOLIC BLOOD PRESSURE: 120 MMHG | WEIGHT: 169 LBS

## 2021-11-15 DIAGNOSIS — D48.5 NEOPLASM OF UNCERTAIN BEHAVIOR OF SKIN OF LIP: Primary | ICD-10-CM

## 2021-11-15 PROCEDURE — 99213 OFFICE O/P EST LOW 20 MIN: CPT | Performed by: FAMILY MEDICINE
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
## 2021-12-07 DIAGNOSIS — Z12.31 ENCOUNTER FOR SCREENING MAMMOGRAM FOR MALIGNANT NEOPLASM OF BREAST: Primary | ICD-10-CM

## 2021-12-09 DIAGNOSIS — R41.840 ATTENTION DEFICIT: ICD-10-CM

## 2022-01-24 DIAGNOSIS — R41.840 ATTENTION DEFICIT: ICD-10-CM

## 2022-03-31 ENCOUNTER — OFFICE VISIT (OUTPATIENT)
Dept: FAMILY MEDICINE CLINIC | Facility: CLINIC | Age: 42
End: 2022-03-31

## 2022-03-31 VITALS
OXYGEN SATURATION: 99 % | HEIGHT: 64 IN | HEART RATE: 95 BPM | DIASTOLIC BLOOD PRESSURE: 86 MMHG | BODY MASS INDEX: 27.91 KG/M2 | WEIGHT: 163.5 LBS | SYSTOLIC BLOOD PRESSURE: 124 MMHG

## 2022-03-31 DIAGNOSIS — M54.2 NECK PAIN: ICD-10-CM

## 2022-03-31 DIAGNOSIS — R41.840 ATTENTION DEFICIT: Primary | ICD-10-CM

## 2022-03-31 PROCEDURE — 99214 OFFICE O/P EST MOD 30 MIN: CPT | Performed by: FAMILY MEDICINE

## 2022-03-31 RX ORDER — METHYLPREDNISOLONE 4 MG/1
TABLET ORAL
Qty: 21 TABLET | Refills: 0 | Status: SHIPPED | OUTPATIENT
Start: 2022-03-31 | End: 2022-07-26

## 2022-03-31 RX ORDER — TIZANIDINE 4 MG/1
4 TABLET ORAL NIGHTLY PRN
Qty: 30 TABLET | Refills: 5 | Status: SHIPPED | OUTPATIENT
Start: 2022-03-31

## 2022-03-31 NOTE — PROGRESS NOTES
"  Subjective   Isis'Clotilde Powell is a 42 y.o. female patient is here today for follow-up on adult ADD, refill Vyvanse which seems to be doing well for her.  Also, she is having significant pain in the left side of the neck.  It radiates toward the shoulder and up toward the head.  She feels like it may have to do with the position that her head is down while working.  It has been bothering her for several days now and at times is quite intense.  She had some old tizanidine at home that she took and it did make her drowsy but she could not tell otherwise it gave her much pain relief.    History of Present Illness       Review of Systems   HENT: Negative.    Respiratory: Negative.  Negative for shortness of breath.    Cardiovascular: Negative.    Gastrointestinal: Negative.    Musculoskeletal: Positive for arthralgias, neck pain and neck stiffness. Negative for myalgias.   Skin: Negative.    Allergic/Immunologic: Negative for immunocompromised state.   Neurological: Negative for dizziness, tremors, seizures, syncope and weakness.   Hematological: Negative.    Psychiatric/Behavioral: Negative for agitation, confusion, dysphoric mood and sleep disturbance. The patient is not nervous/anxious.    All other systems reviewed and are negative.      Objective    Vitals:    03/31/22 0754   BP: 124/86   Pulse: 95   SpO2: 99%   Weight: 74.2 kg (163 lb 8 oz)   Height: 162.6 cm (64\")   PainSc:   5   PainLoc: Neck     Body mass index is 28.06 kg/m².    Physical Exam   Constitutional: She is oriented to person, place, and time. She appears well-developed.   HENT:   Head: Normocephalic and atraumatic.   Nose: Nose normal.   Eyes: Pupils are equal, round, and reactive to light. Conjunctivae are normal.   Neck: No JVD present. No tracheal deviation present. No thyromegaly present.   Patient does have some notable stiffness in the left trapezius muscle and tenderness here   Cardiovascular: Normal rate, regular rhythm and normal " "heart sounds.   No murmur heard.  Pulmonary/Chest: Effort normal and breath sounds normal. She has no wheezes.   Abdominal: Soft. Bowel sounds are normal. She exhibits no distension. There is no abdominal tenderness.   Musculoskeletal: Normal range of motion.   Lymphadenopathy:     She has no cervical adenopathy.   Neurological: She is alert and oriented to person, place, and time. Coordination normal.   Skin: Skin is warm and dry. No rash noted.   Nursing note and vitals reviewed.      Assessment/Plan   Diagnoses and all orders for this visit:    1. Attention deficit (Primary)  -     lisdexamfetamine (Vyvanse) 40 MG capsule; Take 1 capsule by mouth Every Morning  Dispense: 30 capsule; Refill: 0    2. Neck pain  -     methylPREDNISolone (MEDROL) 4 MG dose pack; Take as directed on package instructions.  Dispense: 21 tablet; Refill: 0  -     tiZANidine (ZANAFLEX) 4 MG tablet; Take 1 tablet by mouth At Night As Needed for Muscle Spasms.  Dispense: 30 tablet; Refill: 5    The patient has read and signed the [facility] Controlled Substance Contract.  I will continue to see patient for regular follow up appointments. Patient is well controlled on the medication.  RICCARDO has been reviewed by me and is updated every 3 months. The patient is aware of the potential for addiction and dependence.     Continue Vyvanse at the current dose and recheck in 3 months.    We will give a Medrol Dosepak for inflammation and tizanidine.  Suggest that she may want to consider deep tissue massage or even chiropractic visit for the neck muscle spasm or \"crick\" in the neck.  If not improved within a few days may need to consider physical therapy referral.  Return for x-ray if symptoms persist        This document has been electronically signed by Reyna Juan MD on March 31, 2022 08:51 CDT                             "

## 2022-04-29 DIAGNOSIS — R41.840 ATTENTION DEFICIT: ICD-10-CM

## 2022-05-02 RX ORDER — LISDEXAMFETAMINE DIMESYLATE 40 MG/1
CAPSULE ORAL
Qty: 30 CAPSULE | Refills: 0 | Status: SHIPPED | OUTPATIENT
Start: 2022-05-02 | End: 2022-06-07 | Stop reason: SDUPTHER

## 2022-06-07 DIAGNOSIS — R41.840 ATTENTION DEFICIT: ICD-10-CM

## 2022-07-18 RX ORDER — LEVOTHYROXINE SODIUM 0.03 MG/1
25 TABLET ORAL DAILY
Qty: 30 TABLET | Refills: 5 | Status: SHIPPED | OUTPATIENT
Start: 2022-07-18 | End: 2023-01-23

## 2022-07-26 ENCOUNTER — LAB (OUTPATIENT)
Dept: LAB | Facility: OTHER | Age: 42
End: 2022-07-26

## 2022-07-26 ENCOUNTER — OFFICE VISIT (OUTPATIENT)
Dept: FAMILY MEDICINE CLINIC | Facility: CLINIC | Age: 42
End: 2022-07-26

## 2022-07-26 VITALS
OXYGEN SATURATION: 100 % | WEIGHT: 156.8 LBS | BODY MASS INDEX: 26.77 KG/M2 | TEMPERATURE: 98.9 F | HEART RATE: 106 BPM | SYSTOLIC BLOOD PRESSURE: 138 MMHG | HEIGHT: 64 IN | DIASTOLIC BLOOD PRESSURE: 94 MMHG

## 2022-07-26 DIAGNOSIS — K52.9 GASTROENTERITIS: ICD-10-CM

## 2022-07-26 DIAGNOSIS — E03.9 HYPOTHYROIDISM, UNSPECIFIED TYPE: ICD-10-CM

## 2022-07-26 DIAGNOSIS — R41.840 ATTENTION DEFICIT: Primary | ICD-10-CM

## 2022-07-26 DIAGNOSIS — R53.83 OTHER FATIGUE: ICD-10-CM

## 2022-07-26 DIAGNOSIS — R11.0 NAUSEA: ICD-10-CM

## 2022-07-26 LAB
ALBUMIN SERPL-MCNC: 4.6 G/DL (ref 3.5–5)
ALBUMIN/GLOB SERPL: 1.2 G/DL (ref 1.1–1.8)
ALP SERPL-CCNC: 85 U/L (ref 38–126)
ALT SERPL W P-5'-P-CCNC: 29 U/L
ANION GAP SERPL CALCULATED.3IONS-SCNC: 12 MMOL/L (ref 5–15)
AST SERPL-CCNC: 33 U/L (ref 14–36)
BASOPHILS # BLD AUTO: 0.04 10*3/MM3 (ref 0–0.2)
BASOPHILS NFR BLD AUTO: 0.4 % (ref 0–1.5)
BILIRUB SERPL-MCNC: 0.6 MG/DL (ref 0.2–1.3)
BUN SERPL-MCNC: 10 MG/DL (ref 7–23)
BUN/CREAT SERPL: 11 (ref 7–25)
CALCIUM SPEC-SCNC: 10.1 MG/DL (ref 8.4–10.2)
CHLORIDE SERPL-SCNC: 104 MMOL/L (ref 101–112)
CHOLEST SERPL-MCNC: 239 MG/DL (ref 150–200)
CO2 SERPL-SCNC: 24 MMOL/L (ref 22–30)
CREAT SERPL-MCNC: 0.91 MG/DL (ref 0.52–1.04)
DEPRECATED RDW RBC AUTO: 39 FL (ref 37–54)
EGFRCR SERPLBLD CKD-EPI 2021: 79.9 ML/MIN/1.73
EOSINOPHIL # BLD AUTO: 0.12 10*3/MM3 (ref 0–0.4)
EOSINOPHIL NFR BLD AUTO: 1.3 % (ref 0.3–6.2)
ERYTHROCYTE [DISTWIDTH] IN BLOOD BY AUTOMATED COUNT: 13.4 % (ref 12.3–15.4)
GLOBULIN UR ELPH-MCNC: 3.8 GM/DL (ref 2.3–3.5)
GLUCOSE SERPL-MCNC: 99 MG/DL (ref 70–99)
HCT VFR BLD AUTO: 40.5 % (ref 34–46.6)
HDLC SERPL-MCNC: 73 MG/DL (ref 40–59)
HGB BLD-MCNC: 13.4 G/DL (ref 12–15.9)
LDLC SERPL CALC-MCNC: 148 MG/DL
LDLC/HDLC SERPL: 1.99 {RATIO} (ref 0–3.22)
LYMPHOCYTES # BLD AUTO: 2.42 10*3/MM3 (ref 0.7–3.1)
LYMPHOCYTES NFR BLD AUTO: 25.5 % (ref 19.6–45.3)
MCH RBC QN AUTO: 27 PG (ref 26.6–33)
MCHC RBC AUTO-ENTMCNC: 33.1 G/DL (ref 31.5–35.7)
MCV RBC AUTO: 81.7 FL (ref 79–97)
MONOCYTES # BLD AUTO: 0.67 10*3/MM3 (ref 0.1–0.9)
MONOCYTES NFR BLD AUTO: 7.1 % (ref 5–12)
NEUTROPHILS NFR BLD AUTO: 6.23 10*3/MM3 (ref 1.7–7)
NEUTROPHILS NFR BLD AUTO: 65.7 % (ref 42.7–76)
PLATELET # BLD AUTO: 416 10*3/MM3 (ref 140–450)
PMV BLD AUTO: 8.9 FL (ref 6–12)
POTASSIUM SERPL-SCNC: 4.7 MMOL/L (ref 3.4–5)
PROT SERPL-MCNC: 8.4 G/DL (ref 6.3–8.6)
RBC # BLD AUTO: 4.96 10*6/MM3 (ref 3.77–5.28)
SODIUM SERPL-SCNC: 140 MMOL/L (ref 137–145)
TRIGL SERPL-MCNC: 103 MG/DL
VLDLC SERPL-MCNC: 18 MG/DL (ref 5–40)
WBC NRBC COR # BLD: 9.48 10*3/MM3 (ref 3.4–10.8)

## 2022-07-26 PROCEDURE — 82306 VITAMIN D 25 HYDROXY: CPT | Performed by: FAMILY MEDICINE

## 2022-07-26 PROCEDURE — 82607 VITAMIN B-12: CPT | Performed by: FAMILY MEDICINE

## 2022-07-26 PROCEDURE — 82746 ASSAY OF FOLIC ACID SERUM: CPT | Performed by: FAMILY MEDICINE

## 2022-07-26 PROCEDURE — 36415 COLL VENOUS BLD VENIPUNCTURE: CPT | Performed by: FAMILY MEDICINE

## 2022-07-26 PROCEDURE — 84439 ASSAY OF FREE THYROXINE: CPT | Performed by: FAMILY MEDICINE

## 2022-07-26 PROCEDURE — 99214 OFFICE O/P EST MOD 30 MIN: CPT | Performed by: FAMILY MEDICINE

## 2022-07-26 PROCEDURE — 80061 LIPID PANEL: CPT | Performed by: FAMILY MEDICINE

## 2022-07-26 PROCEDURE — 80050 GENERAL HEALTH PANEL: CPT | Performed by: FAMILY MEDICINE

## 2022-07-26 RX ORDER — ONDANSETRON 4 MG/1
4 TABLET, FILM COATED ORAL EVERY 6 HOURS PRN
Qty: 30 TABLET | Refills: 1 | Status: SHIPPED | OUTPATIENT
Start: 2022-07-26

## 2022-07-26 RX ORDER — DEXTROAMPHETAMINE SACCHARATE, AMPHETAMINE ASPARTATE, DEXTROAMPHETAMINE SULFATE AND AMPHETAMINE SULFATE 2.5; 2.5; 2.5; 2.5 MG/1; MG/1; MG/1; MG/1
10 TABLET ORAL DAILY
Qty: 30 TABLET | Refills: 0 | Status: SHIPPED | OUTPATIENT
Start: 2022-07-26 | End: 2022-10-03 | Stop reason: SDUPTHER

## 2022-07-26 NOTE — PROGRESS NOTES
"  Subjective   Isis'Clotilde Powell is a 42 y.o. female patient is here today for follow-up on adult ADD, refill Vyvanse and Adderall.  She is doing well on the current dose.  She does not always take the evening dose of Adderall but rather uses it for longer days.    She is due for labs including thyroid labs.  She notes that she has been feeling tired so we will check B12 and vitamin levels as well.    She wants to refill Zofran to keep on hand for nausea when needed.    She notes an incident that she thought initially might be a viral gastroenteritis but nothing she may have had an allergic reaction.  She has a severe allergy to pineapples.  She ate something and may have had some pineapple juice or extract in it and had a lot of abdominal pain.  This was over 2 weeks ago and she is still having some issues although it is somewhat better.    History of Present Illness       Review of Systems   HENT: Negative.    Respiratory: Negative.  Negative for shortness of breath.    Cardiovascular: Negative.    Gastrointestinal: Negative.    Musculoskeletal: Positive for arthralgias, neck pain and neck stiffness. Negative for myalgias.   Skin: Negative.    Allergic/Immunologic: Negative for immunocompromised state.   Neurological: Negative for dizziness, tremors, seizures, syncope and weakness.   Hematological: Negative.    Psychiatric/Behavioral: Negative for agitation, confusion, dysphoric mood and sleep disturbance. The patient is not nervous/anxious.    All other systems reviewed and are negative.      Objective    Vitals:    07/26/22 1523   BP: 138/94   Pulse: 106   Temp: 98.9 °F (37.2 °C)   SpO2: 100%   Weight: 71.1 kg (156 lb 12.8 oz)   Height: 162.6 cm (64\")   PainSc: 0-No pain     Body mass index is 26.91 kg/m².    Physical Exam   Constitutional: She is oriented to person, place, and time. She appears well-developed.   HENT:   Head: Normocephalic and atraumatic.   Nose: Nose normal.   Eyes: Pupils are equal, " round, and reactive to light. Conjunctivae are normal.   Neck: No JVD present. No tracheal deviation present. No thyromegaly present.   Cardiovascular: Normal rate, regular rhythm and normal heart sounds.   No murmur heard.  Pulmonary/Chest: Effort normal and breath sounds normal. She has no wheezes.   Abdominal: Soft. Bowel sounds are normal. She exhibits no distension. There is no abdominal tenderness.   Musculoskeletal: Normal range of motion.   Lymphadenopathy:     She has no cervical adenopathy.   Neurological: She is alert and oriented to person, place, and time. Coordination normal.   Skin: Skin is warm and dry. No rash noted.   Nursing note and vitals reviewed.      Assessment & Plan   Diagnoses and all orders for this visit:    1. Attention deficit (Primary)  -     lisdexamfetamine (Vyvanse) 40 MG capsule; Take 1 capsule by mouth Every Morning  Dispense: 30 capsule; Refill: 0  -     amphetamine-dextroamphetamine (Adderall) 10 MG tablet; Take 1 tablet by mouth Daily.  Dispense: 30 tablet; Refill: 0    2. Hypothyroidism, unspecified type  -     Lipid Panel; Future  -     CBC & Differential; Future  -     Comprehensive Metabolic Panel  -     TSH  -     T4, Free    3. Other fatigue  -     Vitamin D 25 Hydroxy  -     Vitamin B12 & Folate    4. Nausea  -     ondansetron (ZOFRAN) 4 MG tablet; Take 1 tablet by mouth Every 6 (Six) Hours As Needed for Nausea or Vomiting.  Dispense: 30 tablet; Refill: 1    5. Gastroenteritis    Will check labs as above for hypothyroidism fatigue and vitamin levels.    Continue current dose of Vyvanse and Adderall, as above    Refilled Zofran to keep for nausea    Suspect she may have had an allergic reaction.  Recommend that she take some Prilosec for 2 to 4 weeks daily as this may help heal some of the irritation but if she still has symptoms after that we will consider further GI work-up.    The patient has read and signed the [facility] Controlled Substance Contract.  I will  continue to see patient for regular follow up appointments. Patient is well controlled on the medication.  RICCARDO has been reviewed by me and is updated every 3 months. The patient is aware of the potential for addiction and dependence.             This document has been electronically signed by Reyna Juan MD on July 26, 2022 15:36 CDT

## 2022-07-27 LAB
25(OH)D3 SERPL-MCNC: 68 NG/ML (ref 30–100)
FOLATE SERPL-MCNC: 16.8 NG/ML (ref 4.78–24.2)
T4 FREE SERPL-MCNC: 1.46 NG/DL (ref 0.93–1.7)
TSH SERPL DL<=0.05 MIU/L-ACNC: 1.81 UIU/ML (ref 0.27–4.2)
VIT B12 BLD-MCNC: 175 PG/ML (ref 211–946)

## 2022-07-27 NOTE — PROGRESS NOTES
Please call the patient regarding her abnormal result.  B12 very low.  Would do shots weekly for a month and then every other week.  Thyroid, vitamin D okay.  LDL cholesterol has gone back up quite a bit.  With her family history should consider statins.  Recommend Crestor 5 mg daily

## 2022-07-28 ENCOUNTER — CLINICAL SUPPORT (OUTPATIENT)
Dept: FAMILY MEDICINE CLINIC | Facility: CLINIC | Age: 42
End: 2022-07-28

## 2022-07-28 DIAGNOSIS — E53.8 VITAMIN B 12 DEFICIENCY: ICD-10-CM

## 2022-07-28 DIAGNOSIS — E53.8 B12 DEFICIENCY: Primary | ICD-10-CM

## 2022-07-28 PROCEDURE — 96372 THER/PROPH/DIAG INJ SC/IM: CPT | Performed by: FAMILY MEDICINE

## 2022-07-28 RX ORDER — CYANOCOBALAMIN 1000 UG/ML
1000 INJECTION, SOLUTION INTRAMUSCULAR; SUBCUTANEOUS
Status: SHIPPED | OUTPATIENT
Start: 2022-09-01

## 2022-07-28 RX ORDER — CYANOCOBALAMIN 1000 UG/ML
1000 INJECTION, SOLUTION INTRAMUSCULAR; SUBCUTANEOUS WEEKLY
Status: SHIPPED | OUTPATIENT
Start: 2022-07-28 | End: 2022-08-25

## 2022-07-28 RX ADMIN — CYANOCOBALAMIN 1000 MCG: 1000 INJECTION, SOLUTION INTRAMUSCULAR; SUBCUTANEOUS at 09:21

## 2022-08-06 RX ORDER — PREDNISONE 10 MG/1
10 TABLET ORAL DAILY
Qty: 7 TABLET | Refills: 0 | Status: SHIPPED | OUTPATIENT
Start: 2022-08-06 | End: 2022-11-08

## 2022-08-06 RX ORDER — LORATADINE AND PSEUDOEPHEDRINE SULFATE 5; 120 MG/1; MG/1
1 TABLET, EXTENDED RELEASE ORAL EVERY MORNING
Qty: 14 TABLET | Refills: 0 | Status: SHIPPED | OUTPATIENT
Start: 2022-08-06 | End: 2022-11-21 | Stop reason: SDUPTHER

## 2022-08-25 DIAGNOSIS — R41.840 ATTENTION DEFICIT: ICD-10-CM

## 2022-09-27 ENCOUNTER — CLINICAL SUPPORT (OUTPATIENT)
Dept: FAMILY MEDICINE CLINIC | Facility: CLINIC | Age: 42
End: 2022-09-27

## 2022-09-27 DIAGNOSIS — E53.8 B12 DEFICIENCY: ICD-10-CM

## 2022-09-27 PROCEDURE — 96372 THER/PROPH/DIAG INJ SC/IM: CPT | Performed by: FAMILY MEDICINE

## 2022-09-27 RX ADMIN — CYANOCOBALAMIN 1000 MCG: 1000 INJECTION, SOLUTION INTRAMUSCULAR; SUBCUTANEOUS at 14:57

## 2022-10-03 DIAGNOSIS — R41.840 ATTENTION DEFICIT: ICD-10-CM

## 2022-10-03 RX ORDER — DEXTROAMPHETAMINE SACCHARATE, AMPHETAMINE ASPARTATE, DEXTROAMPHETAMINE SULFATE AND AMPHETAMINE SULFATE 2.5; 2.5; 2.5; 2.5 MG/1; MG/1; MG/1; MG/1
10 TABLET ORAL DAILY
Qty: 30 TABLET | Refills: 0 | Status: SHIPPED | OUTPATIENT
Start: 2022-10-03

## 2022-11-07 ENCOUNTER — CLINICAL SUPPORT (OUTPATIENT)
Dept: FAMILY MEDICINE CLINIC | Facility: CLINIC | Age: 42
End: 2022-11-07

## 2022-11-07 DIAGNOSIS — E53.8 B12 DEFICIENCY: ICD-10-CM

## 2022-11-07 PROCEDURE — 96372 THER/PROPH/DIAG INJ SC/IM: CPT | Performed by: FAMILY MEDICINE

## 2022-11-07 RX ADMIN — CYANOCOBALAMIN 1000 MCG: 1000 INJECTION, SOLUTION INTRAMUSCULAR; SUBCUTANEOUS at 15:39

## 2022-11-08 ENCOUNTER — OFFICE VISIT (OUTPATIENT)
Dept: FAMILY MEDICINE CLINIC | Facility: CLINIC | Age: 42
End: 2022-11-08

## 2022-11-08 ENCOUNTER — LAB (OUTPATIENT)
Dept: LAB | Facility: OTHER | Age: 42
End: 2022-11-08

## 2022-11-08 VITALS
DIASTOLIC BLOOD PRESSURE: 88 MMHG | BODY MASS INDEX: 25.44 KG/M2 | HEIGHT: 64 IN | TEMPERATURE: 98.4 F | WEIGHT: 149 LBS | OXYGEN SATURATION: 99 % | HEART RATE: 105 BPM | SYSTOLIC BLOOD PRESSURE: 160 MMHG

## 2022-11-08 DIAGNOSIS — E03.9 HYPOTHYROIDISM, UNSPECIFIED TYPE: ICD-10-CM

## 2022-11-08 DIAGNOSIS — R41.840 ATTENTION DEFICIT: ICD-10-CM

## 2022-11-08 DIAGNOSIS — R29.90 MULTIPLE NEUROLOGICAL SYMPTOMS: Primary | ICD-10-CM

## 2022-11-08 DIAGNOSIS — R03.0 ELEVATED BLOOD PRESSURE, SITUATIONAL: ICD-10-CM

## 2022-11-08 DIAGNOSIS — H53.9 VISION DISTURBANCE: ICD-10-CM

## 2022-11-08 LAB
T3FREE SERPL-MCNC: 2.83 PG/ML (ref 2–4.4)
T4 FREE SERPL-MCNC: 1.38 NG/DL (ref 0.93–1.7)
TSH SERPL DL<=0.05 MIU/L-ACNC: 0.93 UIU/ML (ref 0.27–4.2)

## 2022-11-08 PROCEDURE — 84439 ASSAY OF FREE THYROXINE: CPT | Performed by: FAMILY MEDICINE

## 2022-11-08 PROCEDURE — 99214 OFFICE O/P EST MOD 30 MIN: CPT | Performed by: FAMILY MEDICINE

## 2022-11-08 PROCEDURE — 84443 ASSAY THYROID STIM HORMONE: CPT | Performed by: FAMILY MEDICINE

## 2022-11-08 PROCEDURE — 84481 FREE ASSAY (FT-3): CPT | Performed by: FAMILY MEDICINE

## 2022-11-08 PROCEDURE — 36415 COLL VENOUS BLD VENIPUNCTURE: CPT | Performed by: FAMILY MEDICINE

## 2022-11-08 PROCEDURE — 86376 MICROSOMAL ANTIBODY EACH: CPT | Performed by: FAMILY MEDICINE

## 2022-11-08 NOTE — PROGRESS NOTES
Subjective   Isis'Clotilde Powell is a 42 y.o. female patient here today with multiple neurologic type concerns for the past week or 2.  She says that she feels like her brain is just vibrating sometimes.  She is having thought processing issues, not sleeping well, and overall just not feeling like herself.  She had COVID a couple years ago and her taste and smell sensations have never returned to normal.  She had severe fatigue and short-term memory issues following this but feels like she was almost 100% better until recently when this started.  She says that she feels like her eyes are grainy like she has to wipe them but she does not really have what she would describe is blurry vision.  It is worse with stress and she is under a tremendous amount of it.  She said she knows she has not been eating right and she had missed a few doses of her Synthroid but she has been taking it regularly for the last month or so.  She has not done any dramatic diet changes but has made some small changes and lost about 13 pounds.  She has had 1 or 2 episodes of feeling lightheaded but as a rule she has not been having this frequently.  She has occasional headaches in the afternoons.  She does feel very tired.    History of Present Illness       Review of Systems   HENT: Negative.    Respiratory: Negative.  Negative for shortness of breath.    Cardiovascular: Negative.    Gastrointestinal: Negative.    Musculoskeletal: Positive for arthralgias, neck pain and neck stiffness. Negative for myalgias.   Skin: Negative.    Allergic/Immunologic: Negative for immunocompromised state.   Neurological: Negative for dizziness, tremors, seizures, syncope and weakness.   Hematological: Negative.    Psychiatric/Behavioral: Negative for agitation, confusion, dysphoric mood and sleep disturbance. The patient is not nervous/anxious.    All other systems reviewed and are negative.      Objective    Vitals:    11/08/22 1551   BP: 160/88  "  Pulse: 105   Temp: 98.4 °F (36.9 °C)   SpO2: 99%   Weight: 67.6 kg (149 lb)   Height: 162.6 cm (64\")   PainSc: 0-No pain     Body mass index is 25.58 kg/m².    Physical Exam   Constitutional: She is oriented to person, place, and time. She appears well-developed.   HENT:   Head: Normocephalic and atraumatic.   Nose: Nose normal.   Eyes: Pupils are equal, round, and reactive to light. Conjunctivae are normal.   Neck: No JVD present. No tracheal deviation present. No thyromegaly present.   Patient does have some notable stiffness in the left trapezius muscle and tenderness here   Cardiovascular: Normal rate, regular rhythm and normal heart sounds.   No murmur heard.  Pulmonary/Chest: Effort normal and breath sounds normal. She has no wheezes.   Abdominal: Soft. Bowel sounds are normal. She exhibits no distension. There is no abdominal tenderness.   Musculoskeletal: Normal range of motion.   Lymphadenopathy:     She has no cervical adenopathy.   Neurological: She is alert and oriented to person, place, and time. Coordination normal.   Skin: Skin is warm and dry. No rash noted.   Nursing note and vitals reviewed.      Assessment & Plan   Diagnoses and all orders for this visit:    1. Multiple neurological symptoms (Primary)  -     MRI Brain Without Contrast; Future    2. Hypothyroidism, unspecified type  -     TSH; Future  -     T4, free; Future  -     T3, free; Future  -     Thyroid Peroxidase Antibody; Future    3. Vision disturbance  -     Hemoglobin A1c    4. Attention deficit    5. Elevated blood pressure, situational  -     metoprolol tartrate (LOPRESSOR) 25 MG tablet; Take 1 tablet by mouth 2 (Two) Times a Day.  Dispense: 60 tablet; Refill: 5    Due to her having multiple vague persistent neurologic symptoms that have come and gone over the past 2 years we will get an MRI of the brain.  It was initially thought that many of the symptoms were due to COVID but given recurrence and persistence certainly warrants " further evaluation.    Given the elevated blood pressures.  Metoprolol a low dose and have her continue to monitor regularly.    Certainly stress could be a source of all of her symptoms and if further work-up is negative will consider treatment with antianxiety/antidepressant medications and we discussed this today.    Stay on current dose of Synthroid and recheck levels today.    We will check an A1c level as well due to the vision changes        This document has been electronically signed by Reyna Juan MD on November 8, 2022 15:55 CST

## 2022-11-09 LAB — THYROPEROXIDASE AB SERPL-ACNC: <8 IU/ML (ref 0–34)

## 2022-11-10 ENCOUNTER — LAB (OUTPATIENT)
Dept: LAB | Facility: OTHER | Age: 42
End: 2022-11-10

## 2022-11-10 LAB — HBA1C MFR BLD: 5.5 % (ref 4.8–5.6)

## 2022-11-10 PROCEDURE — 36415 COLL VENOUS BLD VENIPUNCTURE: CPT | Performed by: FAMILY MEDICINE

## 2022-11-10 PROCEDURE — 83036 HEMOGLOBIN GLYCOSYLATED A1C: CPT | Performed by: FAMILY MEDICINE

## 2022-11-16 ENCOUNTER — HOSPITAL ENCOUNTER (OUTPATIENT)
Dept: MRI IMAGING | Facility: HOSPITAL | Age: 42
Discharge: HOME | End: 2022-11-16
Admitting: FAMILY MEDICINE

## 2022-11-16 DIAGNOSIS — R29.90 MULTIPLE NEUROLOGICAL SYMPTOMS: ICD-10-CM

## 2022-11-16 PROCEDURE — 70553 MRI BRAIN STEM W/O & W/DYE: CPT

## 2022-11-16 PROCEDURE — 25010000002 GADOTERIDOL PER 1 ML: Performed by: FAMILY MEDICINE

## 2022-11-16 PROCEDURE — A9579 GAD-BASE MR CONTRAST NOS,1ML: HCPCS | Performed by: FAMILY MEDICINE

## 2022-11-16 RX ADMIN — GADOTERIDOL 14 ML: 279.3 INJECTION, SOLUTION INTRAVENOUS at 11:58

## 2022-11-17 DIAGNOSIS — R41.840 ATTENTION DEFICIT: ICD-10-CM

## 2022-11-21 RX ORDER — LORATADINE AND PSEUDOEPHEDRINE SULFATE 5; 120 MG/1; MG/1
1 TABLET, EXTENDED RELEASE ORAL EVERY MORNING
Qty: 30 TABLET | Refills: 5 | Status: SHIPPED | OUTPATIENT
Start: 2022-11-21

## 2022-11-22 ENCOUNTER — CLINICAL SUPPORT (OUTPATIENT)
Dept: FAMILY MEDICINE CLINIC | Facility: CLINIC | Age: 42
End: 2022-11-22

## 2022-11-22 DIAGNOSIS — E53.8 B12 DEFICIENCY: ICD-10-CM

## 2022-11-22 PROCEDURE — 96372 THER/PROPH/DIAG INJ SC/IM: CPT | Performed by: FAMILY MEDICINE

## 2022-11-22 RX ADMIN — CYANOCOBALAMIN 1000 MCG: 1000 INJECTION, SOLUTION INTRAMUSCULAR; SUBCUTANEOUS at 14:25

## 2022-12-20 DIAGNOSIS — R41.840 ATTENTION DEFICIT: ICD-10-CM

## 2023-01-23 RX ORDER — LEVOTHYROXINE SODIUM 0.03 MG/1
25 TABLET ORAL DAILY
Qty: 30 TABLET | Refills: 5 | Status: SHIPPED | OUTPATIENT
Start: 2023-01-23

## 2023-01-24 DIAGNOSIS — R41.840 ATTENTION DEFICIT: ICD-10-CM

## 2023-02-16 ENCOUNTER — OFFICE VISIT (OUTPATIENT)
Dept: FAMILY MEDICINE CLINIC | Facility: CLINIC | Age: 43
End: 2023-02-16
Payer: COMMERCIAL

## 2023-02-16 VITALS
BODY MASS INDEX: 25.27 KG/M2 | HEART RATE: 109 BPM | SYSTOLIC BLOOD PRESSURE: 140 MMHG | DIASTOLIC BLOOD PRESSURE: 92 MMHG | OXYGEN SATURATION: 99 % | WEIGHT: 148 LBS | TEMPERATURE: 98.2 F | HEIGHT: 64 IN

## 2023-02-16 DIAGNOSIS — R92.8 ABNORMAL MAMMOGRAM: ICD-10-CM

## 2023-02-16 DIAGNOSIS — R41.840 ATTENTION DEFICIT: Primary | ICD-10-CM

## 2023-02-16 DIAGNOSIS — R03.0 ELEVATED BLOOD PRESSURE, SITUATIONAL: ICD-10-CM

## 2023-02-16 DIAGNOSIS — Z12.11 SCREEN FOR COLON CANCER: ICD-10-CM

## 2023-02-16 DIAGNOSIS — Z12.31 ENCOUNTER FOR SCREENING MAMMOGRAM FOR MALIGNANT NEOPLASM OF BREAST: ICD-10-CM

## 2023-02-16 PROCEDURE — 99214 OFFICE O/P EST MOD 30 MIN: CPT | Performed by: FAMILY MEDICINE

## 2023-02-16 RX ORDER — METOPROLOL SUCCINATE 25 MG/1
25 TABLET, EXTENDED RELEASE ORAL DAILY
Qty: 30 TABLET | Refills: 5 | Status: SHIPPED | OUTPATIENT
Start: 2023-02-16

## 2023-02-16 NOTE — PROGRESS NOTES
"  Subjective   Isis'Clotildecharity Powell is a 43 y.o. female patient with adult ADD, on Vyvanse and prn short acting Adderall in the afternoons.  This regimen seems to be working well for her and she does not need refills today.  She has been very compliant with medication regimen and monitoring.    She is overdue for a follow-up mammogram.  She had an abnormal mammogram on the left breast that showed what appeared to be fat necrosis but a 6-month follow-up was recommended.  With her busy schedule she never got it done.    Its been several years since she had a screening colonoscopy.  She did have a polyp on the last 1 but thinks its been more than 10 years.    Her blood pressure is a bit elevated today.  She is on the short acting metoprolol but admits that she usually does not take it in the afternoons.  I think with her busy schedule if we switch her to an extended release preparation today might do better at controlling her blood pressure    History of Present Illness       Review of Systems   HENT: Negative.    Respiratory: Negative.  Negative for shortness of breath.    Cardiovascular: Negative.    Gastrointestinal: Negative.    Musculoskeletal: Positive for arthralgias, neck pain and neck stiffness. Negative for myalgias.   Skin: Negative.    Allergic/Immunologic: Negative for immunocompromised state.   Neurological: Negative for dizziness, tremors, seizures, syncope and weakness.   Hematological: Negative.    Psychiatric/Behavioral: Negative for agitation, confusion, dysphoric mood and sleep disturbance. The patient is not nervous/anxious.    All other systems reviewed and are negative.      Objective    Vitals:    02/16/23 1621   BP: 140/92   Pulse: 109   Temp: 98.2 °F (36.8 °C)   SpO2: 99%   Weight: 67.1 kg (148 lb)   Height: 162.6 cm (64\")   PainSc: 0-No pain     Body mass index is 25.4 kg/m².    Physical Exam   Constitutional: She is oriented to person, place, and time. She appears well-developed. "   HENT:   Head: Normocephalic and atraumatic.   Nose: Nose normal.   Eyes: Pupils are equal, round, and reactive to light. Conjunctivae are normal.   Neck: No JVD present. No tracheal deviation present. No thyromegaly present.   Cardiovascular: Normal rate, regular rhythm and normal heart sounds.   No murmur heard.  Pulmonary/Chest: Effort normal and breath sounds normal. She has no wheezes.   Abdominal: Soft. Bowel sounds are normal. She exhibits no distension. There is no abdominal tenderness.   Musculoskeletal: Normal range of motion.   Lymphadenopathy:     She has no cervical adenopathy.   Neurological: She is alert and oriented to person, place, and time. Coordination normal.   Skin: Skin is warm and dry. No rash noted.   Nursing note and vitals reviewed.      Assessment & Plan   Diagnoses and all orders for this visit:    1. Attention deficit (Primary)    2. Encounter for screening mammogram for malignant neoplasm of breast  -     Mammo Screening Modified With Tomosynthesis Right With CAD    3. Abnormal mammogram  -     Mammo Diagnostic Digital Tomosynthesis Left With CAD    4. Elevated blood pressure, situational  -     metoprolol succinate XL (Toprol XL) 25 MG 24 hr tablet; Take 1 tablet by mouth Daily.  Dispense: 30 tablet; Refill: 5    5. Screen for colon cancer  -     Ambulatory Referral to Gastroenterology    The patient has read and signed the [facility] Controlled Substance Contract.  I will continue to see patient for regular follow up appointments. Patient is well controlled on the medication.  RICCARDO has been reviewed by me and is updated every 3 months. The patient is aware of the potential for addiction and dependence.     Continue Vyvanse, Adderall as above and contact me when refills are needed    We will schedule follow-up diagnostic mammogram on the left breast, screening on the right breast.    Refer to gastroenterology for colonoscopy and possibly EGD.    We will switch to extended  release metoprolol and she will continue to monitor blood pressures regularly with goal of 130/80        This document has been electronically signed by Reyna Juan MD on February 16, 2023 16:31 CST

## 2023-02-23 DIAGNOSIS — R41.840 ATTENTION DEFICIT: ICD-10-CM

## 2023-03-30 DIAGNOSIS — R41.840 ATTENTION DEFICIT: ICD-10-CM

## 2023-05-08 DIAGNOSIS — R41.840 ATTENTION DEFICIT: ICD-10-CM

## 2023-06-08 ENCOUNTER — CLINICAL SUPPORT (OUTPATIENT)
Dept: FAMILY MEDICINE CLINIC | Facility: CLINIC | Age: 43
End: 2023-06-08
Payer: COMMERCIAL

## 2023-06-08 DIAGNOSIS — E53.8 B12 DEFICIENCY: Primary | ICD-10-CM

## 2023-06-08 PROCEDURE — 96372 THER/PROPH/DIAG INJ SC/IM: CPT | Performed by: FAMILY MEDICINE

## 2023-06-08 RX ADMIN — CYANOCOBALAMIN 1000 MCG: 1000 INJECTION, SOLUTION INTRAMUSCULAR; SUBCUTANEOUS at 15:10

## 2023-08-17 DIAGNOSIS — R41.840 ATTENTION DEFICIT: ICD-10-CM
